# Patient Record
Sex: MALE | Race: WHITE | Employment: OTHER | URBAN - METROPOLITAN AREA
[De-identification: names, ages, dates, MRNs, and addresses within clinical notes are randomized per-mention and may not be internally consistent; named-entity substitution may affect disease eponyms.]

---

## 2018-02-21 LAB
CHOLESTEROL, TOTAL: 106 MG/DL
CHOLESTEROL/HDL RATIO: 4.2
HDLC SERPL-MCNC: 25 MG/DL (ref 35–70)
LDL CHOLESTEROL CALCULATED: 67 MG/DL (ref 0–160)
TRIGL SERPL-MCNC: 63 MG/DL
VLDLC SERPL CALC-MCNC: ABNORMAL MG/DL

## 2018-12-22 ENCOUNTER — HOSPITAL ENCOUNTER (INPATIENT)
Age: 73
LOS: 1 days | Discharge: HOME | DRG: 378 | End: 2018-12-23
Attending: INTERNAL MEDICINE | Admitting: INTERNAL MEDICINE
Payer: MEDICARE

## 2018-12-22 PROBLEM — D62 ACUTE BLOOD LOSS ANEMIA: Status: ACTIVE | Noted: 2018-12-22

## 2018-12-22 PROBLEM — J45.909 ASTHMA: Chronic | Status: ACTIVE | Noted: 2018-12-22

## 2018-12-22 PROBLEM — D64.9 ANEMIA: Status: ACTIVE | Noted: 2018-12-22

## 2018-12-22 PROBLEM — J44.9 COPD (CHRONIC OBSTRUCTIVE PULMONARY DISEASE) (HCC): Chronic | Status: ACTIVE | Noted: 2018-12-22

## 2018-12-22 PROBLEM — N40.0 BPH (BENIGN PROSTATIC HYPERPLASIA): Chronic | Status: ACTIVE | Noted: 2018-12-22

## 2018-12-22 LAB
ABO/RH: NORMAL
ANTIBODY SCREEN: NORMAL
APTT: 26.3 SEC (ref 24.5–35.1)
BLOOD BANK DISPENSE STATUS: NORMAL
BLOOD BANK PRODUCT CODE: NORMAL
BPU ID: NORMAL
DESCRIPTION BLOOD BANK: NORMAL
HCT VFR BLD CALC: 23.5 % (ref 37–54)
HCT VFR BLD CALC: 24.6 % (ref 37–54)
HEMOGLOBIN: 7.2 G/DL (ref 12.5–16.5)
HEMOGLOBIN: 7.6 G/DL (ref 12.5–16.5)
INR BLD: 1.3
PROTHROMBIN TIME: 14.6 SEC (ref 9.3–12.4)

## 2018-12-22 PROCEDURE — 86900 BLOOD TYPING SEROLOGIC ABO: CPT

## 2018-12-22 PROCEDURE — 94640 AIRWAY INHALATION TREATMENT: CPT

## 2018-12-22 PROCEDURE — 85730 THROMBOPLASTIN TIME PARTIAL: CPT

## 2018-12-22 PROCEDURE — 2060000000 HC ICU INTERMEDIATE R&B

## 2018-12-22 PROCEDURE — 86850 RBC ANTIBODY SCREEN: CPT

## 2018-12-22 PROCEDURE — 86901 BLOOD TYPING SEROLOGIC RH(D): CPT

## 2018-12-22 PROCEDURE — P9016 RBC LEUKOCYTES REDUCED: HCPCS

## 2018-12-22 PROCEDURE — 85018 HEMOGLOBIN: CPT

## 2018-12-22 PROCEDURE — 85610 PROTHROMBIN TIME: CPT

## 2018-12-22 PROCEDURE — C9113 INJ PANTOPRAZOLE SODIUM, VIA: HCPCS | Performed by: INTERNAL MEDICINE

## 2018-12-22 PROCEDURE — 6370000000 HC RX 637 (ALT 250 FOR IP): Performed by: INTERNAL MEDICINE

## 2018-12-22 PROCEDURE — 36430 TRANSFUSION BLD/BLD COMPNT: CPT

## 2018-12-22 PROCEDURE — 36415 COLL VENOUS BLD VENIPUNCTURE: CPT

## 2018-12-22 PROCEDURE — 6360000002 HC RX W HCPCS: Performed by: INTERNAL MEDICINE

## 2018-12-22 PROCEDURE — 86923 COMPATIBILITY TEST ELECTRIC: CPT

## 2018-12-22 PROCEDURE — 2580000003 HC RX 258: Performed by: INTERNAL MEDICINE

## 2018-12-22 PROCEDURE — 94664 DEMO&/EVAL PT USE INHALER: CPT

## 2018-12-22 PROCEDURE — 85014 HEMATOCRIT: CPT

## 2018-12-22 RX ORDER — ESOMEPRAZOLE MAGNESIUM 40 MG/1
40 FOR SUSPENSION ORAL DAILY
Status: ON HOLD | COMMUNITY
End: 2018-12-23 | Stop reason: HOSPADM

## 2018-12-22 RX ORDER — IPRATROPIUM BROMIDE AND ALBUTEROL SULFATE 2.5; .5 MG/3ML; MG/3ML
1 SOLUTION RESPIRATORY (INHALATION) EVERY 4 HOURS PRN
Status: DISCONTINUED | OUTPATIENT
Start: 2018-12-22 | End: 2018-12-23 | Stop reason: HOSPADM

## 2018-12-22 RX ORDER — IPRATROPIUM BROMIDE AND ALBUTEROL SULFATE 2.5; .5 MG/3ML; MG/3ML
1 SOLUTION RESPIRATORY (INHALATION) EVERY 4 HOURS
COMMUNITY
End: 2019-08-13 | Stop reason: SDUPTHER

## 2018-12-22 RX ORDER — ACETAMINOPHEN 325 MG/1
650 TABLET ORAL EVERY 4 HOURS PRN
Status: DISCONTINUED | OUTPATIENT
Start: 2018-12-22 | End: 2018-12-23 | Stop reason: HOSPADM

## 2018-12-22 RX ORDER — FERROUS SULFATE 325(65) MG
325 TABLET ORAL
COMMUNITY

## 2018-12-22 RX ORDER — PANTOPRAZOLE SODIUM 40 MG/10ML
40 INJECTION, POWDER, LYOPHILIZED, FOR SOLUTION INTRAVENOUS DAILY
Status: DISCONTINUED | OUTPATIENT
Start: 2018-12-22 | End: 2018-12-23

## 2018-12-22 RX ORDER — TAMSULOSIN HYDROCHLORIDE 0.4 MG/1
0.4 CAPSULE ORAL DAILY
COMMUNITY
End: 2019-07-08 | Stop reason: SDUPTHER

## 2018-12-22 RX ORDER — SODIUM CHLORIDE 0.9 % (FLUSH) 0.9 %
10 SYRINGE (ML) INJECTION PRN
Status: DISCONTINUED | OUTPATIENT
Start: 2018-12-22 | End: 2018-12-23 | Stop reason: HOSPADM

## 2018-12-22 RX ORDER — GUAIFENESIN 600 MG/1
1200 TABLET, EXTENDED RELEASE ORAL 2 TIMES DAILY
COMMUNITY
End: 2020-01-07 | Stop reason: CLARIF

## 2018-12-22 RX ORDER — SODIUM CHLORIDE 9 MG/ML
INJECTION, SOLUTION INTRAVENOUS CONTINUOUS
Status: DISCONTINUED | OUTPATIENT
Start: 2018-12-22 | End: 2018-12-23

## 2018-12-22 RX ORDER — TAMSULOSIN HYDROCHLORIDE 0.4 MG/1
0.4 CAPSULE ORAL DAILY
Status: DISCONTINUED | OUTPATIENT
Start: 2018-12-22 | End: 2018-12-23 | Stop reason: HOSPADM

## 2018-12-22 RX ORDER — VIT C/B6/B5/MAGNESIUM/HERB 173 50-5-6-5MG
500 CAPSULE ORAL 2 TIMES DAILY
COMMUNITY
End: 2020-01-07 | Stop reason: CLARIF

## 2018-12-22 RX ORDER — VIT C/B6/B5/MAGNESIUM/HERB 173 50-5-6-5MG
1000 CAPSULE ORAL 2 TIMES DAILY
COMMUNITY

## 2018-12-22 RX ORDER — ONDANSETRON 2 MG/ML
4 INJECTION INTRAMUSCULAR; INTRAVENOUS EVERY 6 HOURS PRN
Status: DISCONTINUED | OUTPATIENT
Start: 2018-12-22 | End: 2018-12-23 | Stop reason: HOSPADM

## 2018-12-22 RX ORDER — SODIUM CHLORIDE 0.9 % (FLUSH) 0.9 %
10 SYRINGE (ML) INJECTION EVERY 12 HOURS SCHEDULED
Status: DISCONTINUED | OUTPATIENT
Start: 2018-12-22 | End: 2018-12-23 | Stop reason: HOSPADM

## 2018-12-22 RX ORDER — 0.9 % SODIUM CHLORIDE 0.9 %
250 INTRAVENOUS SOLUTION INTRAVENOUS ONCE
Status: DISCONTINUED | OUTPATIENT
Start: 2018-12-22 | End: 2018-12-23

## 2018-12-22 RX ADMIN — IPRATROPIUM BROMIDE AND ALBUTEROL SULFATE 1 AMPULE: .5; 3 SOLUTION RESPIRATORY (INHALATION) at 08:11

## 2018-12-22 RX ADMIN — IPRATROPIUM BROMIDE AND ALBUTEROL SULFATE 1 AMPULE: .5; 3 SOLUTION RESPIRATORY (INHALATION) at 21:57

## 2018-12-22 RX ADMIN — TAMSULOSIN HYDROCHLORIDE 0.4 MG: 0.4 CAPSULE ORAL at 08:33

## 2018-12-22 RX ADMIN — SODIUM CHLORIDE: 9 INJECTION, SOLUTION INTRAVENOUS at 21:41

## 2018-12-22 RX ADMIN — IPRATROPIUM BROMIDE AND ALBUTEROL SULFATE 1 AMPULE: .5; 3 SOLUTION RESPIRATORY (INHALATION) at 17:47

## 2018-12-22 RX ADMIN — SODIUM CHLORIDE: 9 INJECTION, SOLUTION INTRAVENOUS at 08:33

## 2018-12-22 RX ADMIN — Medication 10 ML: at 08:33

## 2018-12-22 RX ADMIN — PANTOPRAZOLE SODIUM 40 MG: 40 INJECTION, POWDER, FOR SOLUTION INTRAVENOUS at 08:33

## 2018-12-22 ASSESSMENT — PAIN SCALES - GENERAL
PAINLEVEL_OUTOF10: 0

## 2018-12-23 ENCOUNTER — ANESTHESIA EVENT (OUTPATIENT)
Dept: ENDOSCOPY | Age: 73
DRG: 378 | End: 2018-12-23
Payer: MEDICARE

## 2018-12-23 ENCOUNTER — ANESTHESIA (OUTPATIENT)
Dept: ENDOSCOPY | Age: 73
DRG: 378 | End: 2018-12-23
Payer: MEDICARE

## 2018-12-23 VITALS
OXYGEN SATURATION: 93 % | HEIGHT: 71 IN | WEIGHT: 149 LBS | BODY MASS INDEX: 20.86 KG/M2 | RESPIRATION RATE: 18 BRPM | HEART RATE: 121 BPM | SYSTOLIC BLOOD PRESSURE: 98 MMHG | TEMPERATURE: 98.7 F | DIASTOLIC BLOOD PRESSURE: 53 MMHG

## 2018-12-23 VITALS
OXYGEN SATURATION: 94 % | SYSTOLIC BLOOD PRESSURE: 108 MMHG | RESPIRATION RATE: 25 BRPM | DIASTOLIC BLOOD PRESSURE: 64 MMHG

## 2018-12-23 LAB
ANION GAP SERPL CALCULATED.3IONS-SCNC: 12 MMOL/L (ref 7–16)
ANISOCYTOSIS: ABNORMAL
ATYPICAL LYMPHOCYTE RELATIVE PERCENT: 0.9 % (ref 0–4)
BASOPHILS ABSOLUTE: 0 E9/L (ref 0–0.2)
BASOPHILS RELATIVE PERCENT: 0.1 % (ref 0–2)
BUN BLDV-MCNC: 29 MG/DL (ref 8–23)
CALCIUM SERPL-MCNC: 8.1 MG/DL (ref 8.6–10.2)
CHLORIDE BLD-SCNC: 105 MMOL/L (ref 98–107)
CO2: 23 MMOL/L (ref 22–29)
CREAT SERPL-MCNC: 0.7 MG/DL (ref 0.7–1.2)
EOSINOPHILS ABSOLUTE: 0.08 E9/L (ref 0.05–0.5)
EOSINOPHILS RELATIVE PERCENT: 0.9 % (ref 0–6)
GFR AFRICAN AMERICAN: >60
GFR NON-AFRICAN AMERICAN: >60 ML/MIN/1.73
GLUCOSE BLD-MCNC: 88 MG/DL (ref 74–99)
HCT VFR BLD CALC: 22.8 % (ref 37–54)
HCT VFR BLD CALC: 24.5 % (ref 37–54)
HCT VFR BLD CALC: 25 % (ref 37–54)
HEMOGLOBIN: 7.4 G/DL (ref 12.5–16.5)
HEMOGLOBIN: 7.7 G/DL (ref 12.5–16.5)
HEMOGLOBIN: 7.9 G/DL (ref 12.5–16.5)
LYMPHOCYTES ABSOLUTE: 0.28 E9/L (ref 1.5–4)
LYMPHOCYTES RELATIVE PERCENT: 1.8 % (ref 20–42)
MCH RBC QN AUTO: 27.3 PG (ref 26–35)
MCHC RBC AUTO-ENTMCNC: 31.4 % (ref 32–34.5)
MCV RBC AUTO: 86.9 FL (ref 80–99.9)
MONOCYTES ABSOLUTE: 1.02 E9/L (ref 0.1–0.95)
MONOCYTES RELATIVE PERCENT: 10.5 % (ref 2–12)
NEUTROPHILS ABSOLUTE: 8 E9/L (ref 1.8–7.3)
NEUTROPHILS RELATIVE PERCENT: 86 % (ref 43–80)
PDW BLD-RTO: 17 FL (ref 11.5–15)
PLATELET # BLD: 260 E9/L (ref 130–450)
PMV BLD AUTO: 10.2 FL (ref 7–12)
POLYCHROMASIA: ABNORMAL
POTASSIUM SERPL-SCNC: 3.3 MMOL/L (ref 3.5–5)
RBC # BLD: 2.82 E12/L (ref 3.8–5.8)
SODIUM BLD-SCNC: 140 MMOL/L (ref 132–146)
WBC # BLD: 9.3 E9/L (ref 4.5–11.5)

## 2018-12-23 PROCEDURE — 0DJ08ZZ INSPECTION OF UPPER INTESTINAL TRACT, VIA NATURAL OR ARTIFICIAL OPENING ENDOSCOPIC: ICD-10-PCS | Performed by: SURGERY

## 2018-12-23 PROCEDURE — 3609012800 HC EGD DIAGNOSTIC ONLY: Performed by: SURGERY

## 2018-12-23 PROCEDURE — 6360000002 HC RX W HCPCS: Performed by: INTERNAL MEDICINE

## 2018-12-23 PROCEDURE — 85025 COMPLETE CBC W/AUTO DIFF WBC: CPT

## 2018-12-23 PROCEDURE — 2580000003 HC RX 258: Performed by: INTERNAL MEDICINE

## 2018-12-23 PROCEDURE — C9113 INJ PANTOPRAZOLE SODIUM, VIA: HCPCS | Performed by: INTERNAL MEDICINE

## 2018-12-23 PROCEDURE — 2709999900 HC NON-CHARGEABLE SUPPLY: Performed by: SURGERY

## 2018-12-23 PROCEDURE — 7100000000 HC PACU RECOVERY - FIRST 15 MIN: Performed by: SURGERY

## 2018-12-23 PROCEDURE — 6370000000 HC RX 637 (ALT 250 FOR IP): Performed by: INTERNAL MEDICINE

## 2018-12-23 PROCEDURE — 3700000000 HC ANESTHESIA ATTENDED CARE: Performed by: SURGERY

## 2018-12-23 PROCEDURE — 36415 COLL VENOUS BLD VENIPUNCTURE: CPT

## 2018-12-23 PROCEDURE — 85018 HEMOGLOBIN: CPT

## 2018-12-23 PROCEDURE — 3700000001 HC ADD 15 MINUTES (ANESTHESIA): Performed by: SURGERY

## 2018-12-23 PROCEDURE — 85014 HEMATOCRIT: CPT

## 2018-12-23 PROCEDURE — 80048 BASIC METABOLIC PNL TOTAL CA: CPT

## 2018-12-23 PROCEDURE — 7100000001 HC PACU RECOVERY - ADDTL 15 MIN: Performed by: SURGERY

## 2018-12-23 RX ORDER — POTASSIUM CHLORIDE AND SODIUM CHLORIDE 900; 300 MG/100ML; MG/100ML
INJECTION, SOLUTION INTRAVENOUS CONTINUOUS
Status: DISCONTINUED | OUTPATIENT
Start: 2018-12-23 | End: 2018-12-23

## 2018-12-23 RX ORDER — POTASSIUM CHLORIDE 20 MEQ/1
40 TABLET, EXTENDED RELEASE ORAL ONCE
Status: COMPLETED | OUTPATIENT
Start: 2018-12-23 | End: 2018-12-23

## 2018-12-23 RX ADMIN — POTASSIUM CHLORIDE 40 MEQ: 20 TABLET, EXTENDED RELEASE ORAL at 14:22

## 2018-12-23 RX ADMIN — Medication 10 ML: at 09:41

## 2018-12-23 RX ADMIN — PANTOPRAZOLE SODIUM 40 MG: 40 INJECTION, POWDER, FOR SOLUTION INTRAVENOUS at 09:41

## 2018-12-23 ASSESSMENT — PAIN SCALES - GENERAL
PAINLEVEL_OUTOF10: 0

## 2018-12-23 NOTE — ANESTHESIA POSTPROCEDURE EVALUATION
Department of Anesthesiology  Postprocedure Note    Patient: Kam Keita  MRN: [id number]  YOB: 1945  Date of evaluation: 12/23/2018  Time:  9:35 AM     Procedure Summary     Date:  12/23/18 Room / Location:  [facility] ENDOSCOPY    Anesthesia Start:  0808 Anesthesia Stop:  9187    Procedure:  EGD DIAGNOSTIC ONLY (N/A ) Diagnosis:  (unknown)    Surgeon:  Mic Leone MD Responsible Provider:  Grace Garcia DO    Anesthesia Type:  MAC ASA Status:  3          Anesthesia Type: MAC    Marck Phase I: Marck Score: 10    Marck Phase II:      Last vitals: Reviewed and per EMR flowsheets.        Anesthesia Post Evaluation    Patient location during evaluation: PACU  Patient participation: complete - patient participated  Level of consciousness: awake and alert  Pain score: 2  Airway patency: patent  Nausea & Vomiting: no nausea and no vomiting  Complications: no  Cardiovascular status: blood pressure returned to baseline and hemodynamically stable  Respiratory status: acceptable  Hydration status: euvolemic

## 2018-12-23 NOTE — ANESTHESIA PRE PROCEDURE
149 lb (67.6 kg)     Body mass index is 20.78 kg/m². CBC:   Lab Results   Component Value Date    WBC 9.3 12/23/2018    RBC 2.82 12/23/2018    HGB 7.7 12/23/2018    HCT 24.5 12/23/2018    MCV 86.9 12/23/2018    RDW 17.0 12/23/2018     12/23/2018       CMP:   Lab Results   Component Value Date     12/23/2018    K 3.3 12/23/2018     12/23/2018    CO2 23 12/23/2018    BUN 29 12/23/2018    CREATININE 0.7 12/23/2018    GFRAA >60 12/23/2018    LABGLOM >60 12/23/2018    GLUCOSE 88 12/23/2018    CALCIUM 8.1 12/23/2018       POC Tests: No results for input(s): POCGLU, POCNA, POCK, POCCL, POCBUN, POCHEMO, POCHCT in the last 72 hours. Coags:   Lab Results   Component Value Date    PROTIME 14.6 12/22/2018    INR 1.3 12/22/2018    APTT 26.3 12/22/2018       HCG (If Applicable): No results found for: PREGTESTUR, PREGSERUM, HCG, HCGQUANT     ABGs: No results found for: PHART, PO2ART, NFE0XHG, UDU5PLD, BEART, [id number]     Type & Screen (If Applicable):  No results found for: LABABO, LABRH    Anesthesia Evaluation    Airway: Mallampati: II        Dental:          Pulmonary:   (+) COPD:                            ROS comment: Smokes 7 cigarettes a day   Cardiovascular:Negative CV ROS                      Neuro/Psych:   Negative Neuro/Psych ROS              GI/Hepatic/Renal: Neg GI/Hepatic/Renal ROS           ROS comment: Hx GI bleed. Endo/Other: Negative Endo/Other ROS                    Abdominal:           Vascular: negative vascular ROS.                                        Anesthesia Plan      MAC     ASA 3                                 LEI Todd - CRNA   12/23/2018

## 2019-04-15 LAB
CREATININE: 0.8 MG/DL
GLUCOSE BLD-MCNC: 98 MG/DL
POTASSIUM (K+): 4.3

## 2019-05-25 PROBLEM — M48.062 SPINAL STENOSIS OF LUMBAR REGION WITH NEUROGENIC CLAUDICATION: Status: ACTIVE | Noted: 2019-05-25

## 2019-05-25 PROBLEM — J96.11 CHRONIC RESPIRATORY FAILURE WITH HYPOXIA (HCC): Status: ACTIVE | Noted: 2019-05-25

## 2019-05-25 PROBLEM — I65.23 BILATERAL CAROTID ARTERY STENOSIS: Status: ACTIVE | Noted: 2019-05-25

## 2019-05-25 PROBLEM — F32.9 REACTIVE DEPRESSION: Status: ACTIVE | Noted: 2019-05-25

## 2019-05-25 PROBLEM — D50.9 IRON DEFICIENCY ANEMIA: Status: ACTIVE | Noted: 2019-05-25

## 2019-05-25 PROBLEM — I73.9 PERIPHERAL VASCULAR DISEASE (HCC): Status: ACTIVE | Noted: 2019-05-25

## 2019-05-25 PROBLEM — E78.5 HYPERLIPIDEMIA: Status: ACTIVE | Noted: 2019-05-25

## 2019-05-25 PROBLEM — Z72.0 TOBACCO USER: Status: ACTIVE | Noted: 2019-05-25

## 2019-05-25 PROBLEM — I89.0 LYMPHEDEMA: Status: ACTIVE | Noted: 2019-05-25

## 2019-05-25 PROBLEM — N40.0 ENLARGED PROSTATE: Status: ACTIVE | Noted: 2019-05-25

## 2019-05-25 PROBLEM — I25.10 CHRONIC CORONARY ARTERY DISEASE: Status: ACTIVE | Noted: 2019-05-25

## 2019-05-25 PROBLEM — D64.9 CHRONIC ANEMIA: Status: ACTIVE | Noted: 2019-05-25

## 2019-05-25 NOTE — PROGRESS NOTES
19  Nila Trejo : 1945 Sex: male  Age: 69 y.o. No chief complaint on file. Status post hospitalization for acute exacerbation of COPD. Patient also had severe anemia. Had multiple transfusions. Most recently in the emergency room again with exacerbation of COPD. Patient is now oxygen dependent. Patient was seen by Dr. Duane Martinez during the hospitalization but his follow-up was not scheduled until . This is not acceptable and the patient will need to be seen by different pulmonologist in order to have closer follow-up. Patient did stop budesonide and subsequently I would like him to restart that medication. He is using his nebulized medications about every 4 hours while awake the patient does have follow-up this afternoon at noon to have blood transfusion and iron infusion. Patient did have EGD while in the hospital. Patient currently is on omeprazole and Carafate and this will be continued.       Review of Systems  Health Maintenance:  Influenza Vaccination - (10/10/2018)  Couseled on Home Safety - (3/16/2018)  Colonoscopy Screening - (2/3/2016)  Colonoscopy - (2011)  Colonoscopy - (2013)  [place]   1945 Page #2  Colonoscopy - (2/3/2016)  Psa Test - (2018)  Prostate Exam - (2018)  Rectal Exam - (2018)  Physical Exam - (2018)  Colonoscopy - (2010) -KOLOZSI  Aorta Screen -   EGD -  RANGEL'S, POSITIVE FOR H PYLORI EMANI ,- SEE REPORT, 2014-SEVERE EROSIVE  GASTRITIS-2016-KOLOZSI  Capsule Endoscopy - (2013)  Stress Test - (2014) NORMAL CARDIOLITE  Pneumonia Vaccination - Pneumovax 10/01/14  Prevnar slip given 17  Medical Problems:  Hypertension, Hyperlidemia  Tobacco Abuse - (2018) COUNSELING EVERY VISIT  Coronary Artery Disease (CAD) - [place]  CATH IN [place] BY Eleni Meckel LESS THAN 50% STENOSIS  Carotid Artery Stenosis - 30% , WORSENED   Peripheral Vascular Disease (PVD) - RIGHT SIDED CLAUDICATION SYMPTOMS  Lumbar Radiculopathy - MID 80'S FELL AT WORK  Heme Positive Stools - (2/2011) M EMANI- COLONOSCOPY/EGD 2/11  Barrettes Esophagitis - [facility] 2011  Colon Polyps - [facility] 2/11  Anemia - (1/3/2014) REFERRED BACK TO [facility] FOR REPEAT EGD AND POSSIBLE COLONOSCOPY- EGD  SHOWED SEVERE EROSIVE GASTRITIS- PPI BEGUN- CAPSULE AND PUSH ENTEROSCOPY DONE- MULTIPLE  [facility] 2018  COPD - IMPROVED SYMPTOMS WITH INHALERS-TRIAL ADVAIR 3/21/18  Pneumonia - (2/2014) FEB 2019  Thrombocytopenia - (3/2/2015) WOODS UNDERWAY  Severe Dental Attrition/Erosion - (1/20/2017) REFERRED TO MABEL  Chronic Otitis Media - (2/21/2018) REFERRED TO Ramya Armenta  Benign Prostatic Hypertrophy - (3/20/2019) REFERRED TO BL[facility]  Surgical Hx:  Appendectomy  Ear SX - LIPPY MID 80'S RIGHT  Dental Extraction - (2017)  Reviewed, no changes. FH:  Father:  Non Contributory. Mother:  Non Contributory. Reviewed, no changes. SH: Patient is . Personal Habits: Former smoker. Just recently quit. Occasionally drinks beer. Drinks on average 2 pots of coffee a  day. Walks daily. HAD ASBETOS EXPOSURE ON/OFF FOR 30 YEARS  Reviewed and updated. Date: 05/29/2019  Was the patient queried about smoking behavior? Yes   Does the patient currently smoke? Smoking: Patient is a former smoker - (4/8/2019). Was the patient counseled about smoking cessation?  Yes     Current Outpatient Medications:     cyanocobalamin 1000 MCG tablet, Take 1,000 mcg by mouth daily, Disp: , Rfl:     ferrous sulfate 325 (65 Fe) MG tablet, Take 325 mg by mouth daily (with breakfast), Disp: , Rfl:     ipratropium-albuterol (DUONEB) 0.5-2.5 (3) MG/3ML SOLN nebulizer solution, Inhale 1 vial into the lungs every 4 hours, Disp: , Rfl:     tamsulosin (FLOMAX) 0.4 MG capsule, Take 0.4 mg by mouth daily, Disp: , Rfl:     guaiFENesin (MUCINEX) 600 MG extended release tablet, Take 1,200 mg by mouth 2 times daily, Disp: , Rfl:     Turmeric 500 MG CAPS, Take 500 mg by mouth 2 times daily, Disp: , Rfl:     Turmeric (CVS TURMERIC CURCUMIN) 500 MG CAPS, Take 1,000 mg by mouth 2 times daily, Disp: , Rfl:   Allergies   Allergen Reactions    Aspirin      Chronic GI bleeding       Past Medical History:   Diagnosis Date    BPH (benign prostatic hyperplasia) 12/22/2018    COPD (chronic obstructive pulmonary disease) ([facility] [address] 12/22/2018     Past Surgical History:   Procedure Laterality Date    UPPER GASTROINTESTINAL ENDOSCOPY N/A 12/23/2018    EGD DIAGNOSTIC ONLY performed by Rosalee Azar MD at [address] [place] [address]     No family history on file.   Social History     Socioeconomic History    Marital status: Unknown     Spouse name: Not on file    Number of children: Not on file    Years of education: Not on file    Highest education level: Not on file   Occupational History    Not on file   Social Needs    Financial resource strain: Not on file    Food insecurity:     Worry: Not on file     Inability: Not on file    Transportation needs:     Medical: Not on file     Non-medical: Not on file   Tobacco Use    Smoking status: Current Every Day Smoker     Packs/day: 0.50     Types: Cigarettes    Smokeless tobacco: Former User   Substance and Sexual Activity    Alcohol use: Not on file    Drug use: Not on file    Sexual activity: Not on file   Lifestyle    Physical activity:     Days per week: Not on file     Minutes per session: Not on file    Stress: Not on file   Relationships    Social connections:     Talks on phone: Not on file     Gets together: Not on file     Attends Christianity service: Not on file     Active member of club or organization: Not on file     Attends meetings of clubs or organizations: Not on file     Relationship status: Not on file    Intimate partner violence:     Fear of current or ex partner: Not on file     Emotionally abused: Not on file     Physically abused: Not on file     Forced sexual activity: Not on file   Other Topics Concern    Not on file   Social History Narrative    Not on file       There were no vitals filed for this visit. Physical Exam  Exam:  Const: Appears chronically ill, under developed, well nourished and fatigued. Appears slightly underweight. Eyes: EOMI in both eyes. PERRL. ENMT: External ears WNL. Perforated tympanic membrane on the left without erythema. No erythema of the tympanic  membrane on the right. External nose WNL. Moistness and normal color of the nasal mucosae. Septum is in the  midline. Full upper and full lower dentures. Gums appear healthy. Posterior pharynx shows cobblestoned  appearance and moderate postnasal drip, but no exudate, irritation or redness. Neck: Supple and symmetric. Palpation reveals no adenopathy. No masses appreciated. Thyroid exhibits no nodule  or thyromegaly. No JVD respiratory patient has severe diminishment of breath sounds. No crackles or wheezes are heard currently. No dullness to percussion. appreciated over the lower lungs bilaterally or rhonchi appreciated over the lungs bilaterally. CV: Rhythm is regular. S1 is normal. S2 is normal. Grade 3/6, systolic murmur. Carotids with bruits bilaterally. Abdominal aorta: not palpable . Bilateral femoral bruit, more prominent on the left than the right. Pedal pulses are poorly  palpable bilaterally. Extremities: Edema, but no clubbing or cyanosis, woody edema above the knee bilaterally. Abdomen: Bowel sounds are normoactive. Palpation reveals softness, with no distension, organomegaly or  tenderness. No abdominal masses palpable. No palpable hepatosplenomegaly. Musculo: Walks with a normal gait. Upper Extremities: ROM is physiologic. Lower Extremities: Full ROM  bilaterally. Skin: Dry and warm with no rash. Neuro: Alert and oriented x3. Mood is normal. Affect is normal. Speech is articulate and fluent. Reflexes: DTR's are  symmetric, intact and 2+ bilaterally. Psych: Patient's attitude is cooperative. Patient's affect is appropriate.

## 2019-05-29 ENCOUNTER — OFFICE VISIT (OUTPATIENT)
Dept: FAMILY MEDICINE CLINIC | Age: 74
End: 2019-05-29
Payer: MEDICARE

## 2019-05-29 VITALS
HEIGHT: 71 IN | SYSTOLIC BLOOD PRESSURE: 136 MMHG | WEIGHT: 150 LBS | OXYGEN SATURATION: 94 % | HEART RATE: 109 BPM | DIASTOLIC BLOOD PRESSURE: 80 MMHG | BODY MASS INDEX: 21 KG/M2

## 2019-05-29 DIAGNOSIS — E78.2 MIXED HYPERLIPIDEMIA: ICD-10-CM

## 2019-05-29 DIAGNOSIS — F32.9 REACTIVE DEPRESSION: ICD-10-CM

## 2019-05-29 DIAGNOSIS — I89.0 LYMPHEDEMA: ICD-10-CM

## 2019-05-29 DIAGNOSIS — R39.12 BENIGN PROSTATIC HYPERPLASIA WITH WEAK URINARY STREAM: Chronic | ICD-10-CM

## 2019-05-29 DIAGNOSIS — J43.2 CENTRILOBULAR EMPHYSEMA (HCC): Chronic | ICD-10-CM

## 2019-05-29 DIAGNOSIS — D64.9 CHRONIC ANEMIA: ICD-10-CM

## 2019-05-29 DIAGNOSIS — N40.1 BENIGN PROSTATIC HYPERPLASIA WITH WEAK URINARY STREAM: Chronic | ICD-10-CM

## 2019-05-29 DIAGNOSIS — J96.11 CHRONIC RESPIRATORY FAILURE WITH HYPOXIA (HCC): ICD-10-CM

## 2019-05-29 DIAGNOSIS — M48.062 SPINAL STENOSIS OF LUMBAR REGION WITH NEUROGENIC CLAUDICATION: ICD-10-CM

## 2019-05-29 DIAGNOSIS — I65.23 BILATERAL CAROTID ARTERY STENOSIS: ICD-10-CM

## 2019-05-29 DIAGNOSIS — I25.10 CHRONIC CORONARY ARTERY DISEASE: Primary | ICD-10-CM

## 2019-05-29 DIAGNOSIS — I73.9 PERIPHERAL VASCULAR DISEASE (HCC): ICD-10-CM

## 2019-05-29 DIAGNOSIS — Z72.0 TOBACCO USER: ICD-10-CM

## 2019-05-29 PROCEDURE — 99214 OFFICE O/P EST MOD 30 MIN: CPT | Performed by: INTERNAL MEDICINE

## 2019-05-29 RX ORDER — BUDESONIDE 0.5 MG/2ML
500 INHALANT ORAL 2 TIMES DAILY
Qty: 60 AMPULE | Refills: 5 | Status: SHIPPED | OUTPATIENT
Start: 2019-05-29 | End: 2020-01-07 | Stop reason: SDUPTHER

## 2019-05-29 RX ORDER — ACETAMINOPHEN 325 MG/1
TABLET ORAL
COMMUNITY
Start: 2018-11-15 | End: 2020-01-07 | Stop reason: CLARIF

## 2019-05-29 RX ORDER — SUCRALFATE 1 G/1
TABLET ORAL
COMMUNITY
Start: 2019-05-18 | End: 2019-05-29 | Stop reason: SDUPTHER

## 2019-05-29 RX ORDER — PANTOPRAZOLE SODIUM 40 MG/1
TABLET, DELAYED RELEASE ORAL
Refills: 0 | COMMUNITY
Start: 2019-05-19 | End: 2019-05-29 | Stop reason: SDUPTHER

## 2019-05-29 RX ORDER — PANTOPRAZOLE SODIUM 40 MG/1
40 TABLET, DELAYED RELEASE ORAL DAILY
Qty: 90 TABLET | Refills: 1 | Status: SHIPPED | OUTPATIENT
Start: 2019-05-29 | End: 2020-01-07 | Stop reason: SDUPTHER

## 2019-05-29 RX ORDER — SUCRALFATE 1 G/1
1 TABLET ORAL 4 TIMES DAILY
Qty: 120 TABLET | Refills: 1 | Status: SHIPPED | OUTPATIENT
Start: 2019-05-29 | End: 2019-12-05 | Stop reason: SDUPTHER

## 2019-06-05 ENCOUNTER — TELEPHONE (OUTPATIENT)
Dept: FAMILY MEDICINE CLINIC | Age: 74
End: 2019-06-05

## 2019-06-06 ENCOUNTER — TELEPHONE (OUTPATIENT)
Dept: FAMILY MEDICINE CLINIC | Age: 74
End: 2019-06-06

## 2019-06-06 NOTE — TELEPHONE ENCOUNTER
Nurse rose lee stating thank you for the referral they will get to pt this weekend or early next week

## 2019-06-21 ENCOUNTER — TELEPHONE (OUTPATIENT)
Dept: FAMILY MEDICINE CLINIC | Age: 74
End: 2019-06-21

## 2019-06-21 NOTE — TELEPHONE ENCOUNTER
Priyank Feliz from [facility] calling to get some med  refill sent for him. When I calleed the pharmacy, they had all his scripts from May 29.

## 2019-06-24 LAB
BASOPHILS ABSOLUTE: ABNORMAL
BASOPHILS RELATIVE PERCENT: 2 %
EOSINOPHILS ABSOLUTE: 0.12 /ΜL
EOSINOPHILS RELATIVE PERCENT: 5 %
HCT VFR BLD CALC: 33.1 % (ref 41–53)
HEMOGLOBIN: 10.2 G/DL (ref 13.5–17.5)
LYMPHOCYTES ABSOLUTE: 0.51 /ΜL
LYMPHOCYTES RELATIVE PERCENT: 8 %
MCH RBC QN AUTO: 23.5 PG
MCHC RBC AUTO-ENTMCNC: 30.9 G/DL
MCV RBC AUTO: 76.1 FL
MONOCYTES ABSOLUTE: 0.7 /ΜL
MONOCYTES RELATIVE PERCENT: 11 %
NEUTROPHILS ABSOLUTE: ABNORMAL
NEUTROPHILS RELATIVE PERCENT: 4.73 %
PDW BLD-RTO: 22.8 %
PLATELET # BLD: 279 K/ΜL
PMV BLD AUTO: 7.5 FL
RBC # BLD: 4.35 10^6/ΜL
WBC # BLD: 6.4 10^3/ML

## 2019-07-02 RX ORDER — TAMSULOSIN HYDROCHLORIDE 0.4 MG/1
CAPSULE ORAL
Qty: 30 CAPSULE | Refills: 5 | OUTPATIENT
Start: 2019-07-02

## 2019-07-08 RX ORDER — TAMSULOSIN HYDROCHLORIDE 0.4 MG/1
0.4 CAPSULE ORAL DAILY
Qty: 30 CAPSULE | Refills: 1 | Status: SHIPPED | OUTPATIENT
Start: 2019-07-08 | End: 2020-01-07 | Stop reason: SDUPTHER

## 2019-08-01 ENCOUNTER — TELEPHONE (OUTPATIENT)
Dept: ADMINISTRATIVE | Age: 74
End: 2019-08-01

## 2019-08-02 ENCOUNTER — OFFICE VISIT (OUTPATIENT)
Dept: FAMILY MEDICINE CLINIC | Age: 74
End: 2019-08-02
Payer: MEDICARE

## 2019-08-02 VITALS
HEART RATE: 74 BPM | OXYGEN SATURATION: 95 % | SYSTOLIC BLOOD PRESSURE: 106 MMHG | BODY MASS INDEX: 20.08 KG/M2 | DIASTOLIC BLOOD PRESSURE: 64 MMHG | WEIGHT: 146 LBS

## 2019-08-02 DIAGNOSIS — I25.10 CHRONIC CORONARY ARTERY DISEASE: ICD-10-CM

## 2019-08-02 DIAGNOSIS — J43.2 CENTRILOBULAR EMPHYSEMA (HCC): ICD-10-CM

## 2019-08-02 DIAGNOSIS — M54.10 RADICULOPATHY, UNSPECIFIED SPINAL REGION: Primary | ICD-10-CM

## 2019-08-02 PROCEDURE — 99213 OFFICE O/P EST LOW 20 MIN: CPT | Performed by: INTERNAL MEDICINE

## 2019-08-02 RX ORDER — PREDNISONE 10 MG/1
TABLET ORAL
Qty: 12 TABLET | Refills: 0 | Status: SHIPPED | OUTPATIENT
Start: 2019-08-02 | End: 2019-08-08

## 2019-08-03 ENCOUNTER — TELEPHONE (OUTPATIENT)
Dept: FAMILY MEDICINE CLINIC | Age: 74
End: 2019-08-03

## 2019-08-03 ASSESSMENT — ENCOUNTER SYMPTOMS
COUGH: 1
SHORTNESS OF BREATH: 1
COLOR CHANGE: 0
ABDOMINAL PAIN: 0
WHEEZING: 0
BACK PAIN: 1

## 2019-08-05 LAB
BASOPHILS ABSOLUTE: NORMAL /ΜL
BASOPHILS RELATIVE PERCENT: NORMAL %
EOSINOPHILS ABSOLUTE: NORMAL /ΜL
EOSINOPHILS RELATIVE PERCENT: NORMAL %
HCT VFR BLD CALC: NORMAL % (ref 41–53)
HEMOGLOBIN: NORMAL G/DL (ref 13.5–17.5)
LYMPHOCYTES ABSOLUTE: NORMAL /ΜL
LYMPHOCYTES RELATIVE PERCENT: NORMAL %
MCH RBC QN AUTO: NORMAL PG
MCHC RBC AUTO-ENTMCNC: NORMAL G/DL
MCV RBC AUTO: NORMAL FL
MONOCYTES ABSOLUTE: NORMAL /ΜL
MONOCYTES RELATIVE PERCENT: NORMAL %
NEUTROPHILS ABSOLUTE: NORMAL /ΜL
NEUTROPHILS RELATIVE PERCENT: NORMAL %
PLATELET # BLD: NORMAL K/ΜL
PMV BLD AUTO: NORMAL FL
RBC # BLD: NORMAL 10^6/ΜL
WBC # BLD: NORMAL 10^3/ML

## 2019-08-13 PROBLEM — R09.02 HYPOXIA: Status: ACTIVE | Noted: 2019-08-13

## 2019-08-14 RX ORDER — ESOMEPRAZOLE MAGNESIUM 40 MG/1
40 CAPSULE, DELAYED RELEASE ORAL
COMMUNITY
End: 2020-01-07 | Stop reason: CLARIF

## 2019-08-19 ENCOUNTER — OFFICE VISIT (OUTPATIENT)
Dept: PRIMARY CARE CLINIC | Age: 74
End: 2019-08-19
Payer: MEDICARE

## 2019-08-19 VITALS
BODY MASS INDEX: 20.72 KG/M2 | HEART RATE: 82 BPM | HEIGHT: 71 IN | OXYGEN SATURATION: 94 % | WEIGHT: 148 LBS | DIASTOLIC BLOOD PRESSURE: 60 MMHG | SYSTOLIC BLOOD PRESSURE: 116 MMHG

## 2019-08-19 DIAGNOSIS — D50.0 IRON DEFICIENCY ANEMIA DUE TO CHRONIC BLOOD LOSS: ICD-10-CM

## 2019-08-19 DIAGNOSIS — S32.008D CLOSED CRUSH FRACTURE OF LUMBAR VERTEBRA WITH ROUTINE HEALING, SUBSEQUENT ENCOUNTER: ICD-10-CM

## 2019-08-19 DIAGNOSIS — J43.2 CENTRILOBULAR EMPHYSEMA (HCC): ICD-10-CM

## 2019-08-19 DIAGNOSIS — N40.1 BENIGN PROSTATIC HYPERPLASIA WITH WEAK URINARY STREAM: ICD-10-CM

## 2019-08-19 DIAGNOSIS — R39.12 BENIGN PROSTATIC HYPERPLASIA WITH WEAK URINARY STREAM: ICD-10-CM

## 2019-08-19 DIAGNOSIS — S22.008D: Primary | ICD-10-CM

## 2019-08-19 PROCEDURE — 99214 OFFICE O/P EST MOD 30 MIN: CPT | Performed by: INTERNAL MEDICINE

## 2019-08-19 RX ORDER — CALCITONIN SALMON 200 [IU]/.09ML
1 SPRAY, METERED NASAL DAILY
Qty: 1 BOTTLE | Refills: 3 | Status: SHIPPED | OUTPATIENT
Start: 2019-08-19 | End: 2020-01-07 | Stop reason: CLARIF

## 2019-08-19 RX ORDER — TRAMADOL HYDROCHLORIDE 50 MG/1
50 TABLET ORAL EVERY 6 HOURS PRN
Qty: 28 TABLET | Refills: 0 | Status: SHIPPED | OUTPATIENT
Start: 2019-08-19 | End: 2019-08-26

## 2019-08-19 NOTE — PROGRESS NOTES
TO [facility]MAN[facility] FOR REPEAT EGD AND POSSIBLE COLONOSCOPY- EGD  SHOWED SEVERE EROSIVE GASTRITIS- PPI BEGUN- CAPSULE AND PUSH ENTEROSCOPY DONE- MULTIPLE  [facility]  [facility], NOW [facility] 2018  COPD - IMPROVED SYMPTOMS WITH INHALERS-TRIAL ADVAIR 3/21/18  Pneumonia - (2/2014) FEB 2019  Thrombocytopenia - (3/2/2015) WOODS UNDERWAY  Severe Dental Attrition/Erosion - (1/20/2017) REFERRED TO MABEL  Chronic Otitis Media - (2/21/2018) REFERRED TO NENITA  Benign Prostatic Hypertrophy - (3/20/2019) REFERRED TO BLACK  Surgical Hx:  Appendectomy  Ear SX - LIPPY MID 80'S RIGHT  Dental Extraction - (2017)  Reviewed, no changes. FH:  Father:  Non Contributory. Mother:  Non Contributory. Reviewed, no changes. SH: Patient is . Personal Habits: Former smoker. Just recently quit. Occasionally drinks beer. Drinks on average 2 pots of coffee a  day. Walks daily. HAD ASBETOS EXPOSURE ON/OFF FOR 30 YEARS  Reviewed and updated. Date: 05/29/2019  Was the patient queried about smoking behavior? Yes   Does the patient currently smoke? Smoking: Patient is a former smoker - (4/8/2019). Was the patient counseled about smoking cessation?  Yes     Current Outpatient Medications:     albuterol-ipratropium (COMBIVENT RESPIMAT)  MCG/ACT AERS inhaler, Inhale 1 puff into the lungs every 6 hours, Disp: , Rfl:     Fluticasone-Umeclidin-Vilant (TRELEGY ELLIPTA) 100-62.5-25 MCG/INH AEPB, Inhale into the lungs, Disp: , Rfl:     esomeprazole (NEXIUM) 40 MG delayed release capsule, Take 40 mg by mouth every morning (before breakfast), Disp: , Rfl:     vitamin D (CHOLECALCIFEROL) 1000 units TABS tablet, Take 1,000 Units by mouth daily, Disp: , Rfl:     ipratropium-albuterol (DUONEB) 0.5-2.5 (3) MG/3ML SOLN nebulizer solution, Inhale 3 mLs into the lungs every 4 hours, Disp: 180 vial, Rfl: 5    tamsulosin (FLOMAX) 0.4 MG capsule, Take 1 capsule by mouth daily, Disp: 30 capsule, Rfl: 1    acetaminophen (TYLENOL) 325 MG tablet, Take by

## 2019-08-25 DIAGNOSIS — I73.9 PERIPHERAL VASCULAR DISEASE (HCC): Primary | ICD-10-CM

## 2019-08-25 DIAGNOSIS — E78.2 MIXED HYPERLIPIDEMIA: ICD-10-CM

## 2019-08-26 ENCOUNTER — TELEPHONE (OUTPATIENT)
Dept: ADMINISTRATIVE | Age: 74
End: 2019-08-26

## 2019-09-06 DIAGNOSIS — E78.2 MIXED HYPERLIPIDEMIA: ICD-10-CM

## 2019-09-06 DIAGNOSIS — I73.9 PERIPHERAL VASCULAR DISEASE (HCC): ICD-10-CM

## 2019-09-09 LAB
BASOPHILS ABSOLUTE: ABNORMAL /ΜL
BASOPHILS RELATIVE PERCENT: ABNORMAL %
EOSINOPHILS ABSOLUTE: ABNORMAL /ΜL
EOSINOPHILS RELATIVE PERCENT: ABNORMAL %
HCT VFR BLD CALC: 31.1 % (ref 41–53)
HEMOGLOBIN: 9.8 G/DL (ref 13.5–17.5)
LYMPHOCYTES ABSOLUTE: ABNORMAL /ΜL
LYMPHOCYTES RELATIVE PERCENT: ABNORMAL %
MCH RBC QN AUTO: ABNORMAL PG
MCHC RBC AUTO-ENTMCNC: ABNORMAL G/DL
MCV RBC AUTO: ABNORMAL FL
MONOCYTES ABSOLUTE: ABNORMAL /ΜL
MONOCYTES RELATIVE PERCENT: ABNORMAL %
NEUTROPHILS ABSOLUTE: ABNORMAL /ΜL
NEUTROPHILS RELATIVE PERCENT: ABNORMAL %
PLATELET # BLD: 213 K/ΜL
PMV BLD AUTO: ABNORMAL FL
RBC # BLD: 3.94 10^6/ΜL
WBC # BLD: 5.9 10^3/ML

## 2019-09-10 ENCOUNTER — OFFICE VISIT (OUTPATIENT)
Dept: FAMILY MEDICINE CLINIC | Age: 74
End: 2019-09-10
Payer: MEDICARE

## 2019-09-10 VITALS
SYSTOLIC BLOOD PRESSURE: 114 MMHG | HEART RATE: 64 BPM | BODY MASS INDEX: 20.36 KG/M2 | OXYGEN SATURATION: 95 % | TEMPERATURE: 98.3 F | WEIGHT: 146 LBS | DIASTOLIC BLOOD PRESSURE: 68 MMHG

## 2019-09-10 DIAGNOSIS — J43.2 CENTRILOBULAR EMPHYSEMA (HCC): Chronic | ICD-10-CM

## 2019-09-10 DIAGNOSIS — Z23 ENCOUNTER FOR IMMUNIZATION: Primary | ICD-10-CM

## 2019-09-10 DIAGNOSIS — S22.008D: ICD-10-CM

## 2019-09-10 PROBLEM — C61 PROSTATE CANCER (HCC): Status: ACTIVE | Noted: 2019-09-10

## 2019-09-10 PROCEDURE — G0008 ADMIN INFLUENZA VIRUS VAC: HCPCS | Performed by: INTERNAL MEDICINE

## 2019-09-10 PROCEDURE — 90653 IIV ADJUVANT VACCINE IM: CPT | Performed by: INTERNAL MEDICINE

## 2019-09-10 PROCEDURE — 99214 OFFICE O/P EST MOD 30 MIN: CPT | Performed by: INTERNAL MEDICINE

## 2019-09-10 RX ORDER — OXYCODONE AND ACETAMINOPHEN 7.5; 325 MG/1; MG/1
1 TABLET ORAL EVERY 8 HOURS PRN
Qty: 90 TABLET | Refills: 0 | Status: SHIPPED | OUTPATIENT
Start: 2019-09-10 | End: 2020-01-07 | Stop reason: SDUPTHER

## 2019-09-10 NOTE — PROGRESS NOTES
esomeprazole (NEXIUM) 40 MG delayed release capsule, Take 40 mg by mouth every morning (before breakfast), Disp: , Rfl:     vitamin D (CHOLECALCIFEROL) 1000 units TABS tablet, Take 1,000 Units by mouth daily, Disp: , Rfl:     ipratropium-albuterol (DUONEB) 0.5-2.5 (3) MG/3ML SOLN nebulizer solution, Inhale 3 mLs into the lungs every 4 hours, Disp: 180 vial, Rfl: 5    tamsulosin (FLOMAX) 0.4 MG capsule, Take 1 capsule by mouth daily, Disp: 30 capsule, Rfl: 1    acetaminophen (TYLENOL) 325 MG tablet, Take by mouth, Disp: , Rfl:     sucralfate (CARAFATE) 1 GM tablet, Take 1 tablet by mouth 4 times daily, Disp: 120 tablet, Rfl: 1    pantoprazole (PROTONIX) 40 MG tablet, Take 1 tablet by mouth daily, Disp: 90 tablet, Rfl: 1    budesonide (PULMICORT) 0.5 MG/2ML nebulizer suspension, Take 2 mLs by nebulization 2 times daily, Disp: 60 ampule, Rfl: 5    cyanocobalamin 1000 MCG tablet, Take 1,000 mcg by mouth daily, Disp: , Rfl:     ferrous sulfate 325 (65 Fe) MG tablet, Take 325 mg by mouth daily (with breakfast), Disp: , Rfl:     guaiFENesin (MUCINEX) 600 MG extended release tablet, Take 1,200 mg by mouth 2 times daily, Disp: , Rfl:     Turmeric 500 MG CAPS, Take 500 mg by mouth 2 times daily, Disp: , Rfl:     Turmeric (CVS TURMERIC CURCUMIN) 500 MG CAPS, Take 1,000 mg by mouth 2 times daily, Disp: , Rfl:   Allergies   Allergen Reactions    Aspirin      Chronic GI bleeding       Past Medical History:   Diagnosis Date    BPH (benign prostatic hyperplasia) 12/22/2018    COPD (chronic obstructive pulmonary disease) ([facility] [address] 12/22/2018     Past Surgical History:   Procedure Laterality Date    BRONCHOSCOPY      UPPER GASTROINTESTINAL ENDOSCOPY N/A 12/23/2018    EGD DIAGNOSTIC ONLY performed by Negrita Cordon MD at [facility] ENDOSCOPY     No family history on file.   Social History     Socioeconomic History    Marital status: Unknown     Spouse name: Not on file    Number of children: Not on file    Years of oxyCODONE-acetaminophen (ENDOCET) 7.5-325 MG per tablet; Take 1 tablet by mouth every 8 hours as needed for Pain for up to 30 days. Intended supply: 30 days  -     Gabby Wood DO, Orthopaedics, [place] ([facility])    Since anemia has been stable. Today his lung disease is stable. He is medically optimized for surgical procedure. The note will be sent to Dr. Iris Robison and I will communicate directly to Dr. Iris Robison to have the surgery done as quickly as possible. I have also referred him to Dr. James May regarding his thoracic spine pain. Pain medication is provided as the patient is not gotten sufficient relief using hydrocodone and Miacalcin nasal spray.

## 2019-09-21 LAB
ALBUMIN SERPL-MCNC: 4 G/DL
ALP BLD-CCNC: 52 U/L
ALT SERPL-CCNC: 12 U/L
ANION GAP SERPL CALCULATED.3IONS-SCNC: 1.1 MMOL/L
AST SERPL-CCNC: 20 U/L
BASOPHILS ABSOLUTE: 0.23 /ΜL
BASOPHILS RELATIVE PERCENT: 3 %
BILIRUB SERPL-MCNC: 0.8 MG/DL (ref 0.1–1.4)
BILIRUBIN URINE: NORMAL MG/DL
BILIRUBIN, URINE: NEGATIVE
BLOOD, URINE: NORMAL
BLOOD, URINE: POSITIVE
BUN BLDV-MCNC: 14 MG/DL
CALCIUM SERPL-MCNC: 8.8 MG/DL
CHLORIDE BLD-SCNC: 103 MMOL/L
CLARITY: ABNORMAL
CLARITY: NORMAL
CO2: 26 MMOL/L
COLOR: ABNORMAL
COLOR: NORMAL
CREAT SERPL-MCNC: 0.9 MG/DL
EOSINOPHILS ABSOLUTE: 0.15 /ΜL
EOSINOPHILS RELATIVE PERCENT: 2 %
GFR CALCULATED: NORMAL
GLUCOSE BLD-MCNC: 99 MG/DL
GLUCOSE URINE: NEGATIVE
GLUCOSE URINE: NORMAL
HCT VFR BLD CALC: 30.8 % (ref 41–53)
HEMOGLOBIN: 9.7 G/DL (ref 13.5–17.5)
KETONES, URINE: NEGATIVE
KETONES, URINE: NORMAL
LEUKOCYTE ESTERASE, URINE: NORMAL
LEUKOCYTE ESTERASE, URINE: POSITIVE
LYMPHOCYTES ABSOLUTE: 0.85 /ΜL
LYMPHOCYTES RELATIVE PERCENT: 11 %
MCH RBC QN AUTO: 25.3 PG
MCHC RBC AUTO-ENTMCNC: 31.6 G/DL
MCV RBC AUTO: 80 FL
MONOCYTES ABSOLUTE: 0.78 /ΜL
MONOCYTES RELATIVE PERCENT: 10 %
NEUTROPHILS ABSOLUTE: 5.77 /ΜL
NEUTROPHILS RELATIVE PERCENT: ABNORMAL %
NITRITE, URINE: NEGATIVE
NITRITE, URINE: NORMAL
PH UA: 6 (ref 4.5–8)
PH UA: NORMAL (ref 4.5–8)
PLATELET # BLD: 265 K/ΜL
PMV BLD AUTO: 8.1 FL
POTASSIUM SERPL-SCNC: 4.3 MMOL/L
PROTEIN UA: NORMAL
PROTEIN UA: POSITIVE
RBC # BLD: 3.85 10^6/ΜL
SODIUM BLD-SCNC: 138 MMOL/L
SPECIFIC GRAVITY UA: NORMAL (ref 1–1.03)
SPECIFIC GRAVITY, URINE: 1.01
TOTAL PROTEIN: 7.5
UROBILINOGEN, URINE: NORMAL
UROBILINOGEN, URINE: NORMAL
WBC # BLD: 7.8 10^3/ML

## 2019-10-07 LAB
BASOPHILS ABSOLUTE: NORMAL
BASOPHILS RELATIVE PERCENT: NORMAL
EOSINOPHILS ABSOLUTE: NORMAL
EOSINOPHILS RELATIVE PERCENT: NORMAL
FERRITIN: NORMAL
HCT VFR BLD CALC: NORMAL %
HEMOGLOBIN: NORMAL
IRON: NORMAL
LYMPHOCYTES ABSOLUTE: NORMAL
LYMPHOCYTES RELATIVE PERCENT: NORMAL
MCH RBC QN AUTO: NORMAL PG
MCHC RBC AUTO-ENTMCNC: NORMAL G/DL
MCV RBC AUTO: NORMAL FL
MONOCYTES ABSOLUTE: NORMAL
MONOCYTES RELATIVE PERCENT: NORMAL
NEUTROPHILS ABSOLUTE: NORMAL
NEUTROPHILS RELATIVE PERCENT: NORMAL
PDW BLD-RTO: NORMAL %
PLATELET # BLD: NORMAL 10*3/UL
PMV BLD AUTO: NORMAL FL
RBC # BLD: NORMAL 10*6/UL
TOTAL IRON BINDING CAPACITY: NORMAL
WBC # BLD: NORMAL 10*3/UL

## 2019-12-05 DIAGNOSIS — D64.9 CHRONIC ANEMIA: ICD-10-CM

## 2019-12-05 RX ORDER — SUCRALFATE 1 G/1
1 TABLET ORAL 4 TIMES DAILY
Qty: 120 TABLET | Refills: 2 | Status: SHIPPED
Start: 2019-12-05 | End: 2020-04-30 | Stop reason: SDUPTHER

## 2020-01-07 ENCOUNTER — OFFICE VISIT (OUTPATIENT)
Dept: FAMILY MEDICINE CLINIC | Age: 75
End: 2020-01-07
Payer: MEDICARE

## 2020-01-07 VITALS — OXYGEN SATURATION: 100 % | HEART RATE: 90 BPM | DIASTOLIC BLOOD PRESSURE: 70 MMHG | SYSTOLIC BLOOD PRESSURE: 118 MMHG

## 2020-01-07 PROBLEM — E61.1 IRON DEFICIENCY: Status: ACTIVE | Noted: 2020-01-07

## 2020-01-07 PROBLEM — C67.8 MALIGNANT NEOPLASM OF OVERLAPPING SITES OF BLADDER (HCC): Status: ACTIVE | Noted: 2020-01-07

## 2020-01-07 PROCEDURE — G8427 DOCREV CUR MEDS BY ELIG CLIN: HCPCS | Performed by: INTERNAL MEDICINE

## 2020-01-07 PROCEDURE — 4040F PNEUMOC VAC/ADMIN/RCVD: CPT | Performed by: INTERNAL MEDICINE

## 2020-01-07 PROCEDURE — 3017F COLORECTAL CA SCREEN DOC REV: CPT | Performed by: INTERNAL MEDICINE

## 2020-01-07 PROCEDURE — G8420 CALC BMI NORM PARAMETERS: HCPCS | Performed by: INTERNAL MEDICINE

## 2020-01-07 PROCEDURE — 99214 OFFICE O/P EST MOD 30 MIN: CPT | Performed by: INTERNAL MEDICINE

## 2020-01-07 PROCEDURE — 3023F SPIROM DOC REV: CPT | Performed by: INTERNAL MEDICINE

## 2020-01-07 PROCEDURE — 4004F PT TOBACCO SCREEN RCVD TLK: CPT | Performed by: INTERNAL MEDICINE

## 2020-01-07 PROCEDURE — G8926 SPIRO NO PERF OR DOC: HCPCS | Performed by: INTERNAL MEDICINE

## 2020-01-07 PROCEDURE — 1123F ACP DISCUSS/DSCN MKR DOCD: CPT | Performed by: INTERNAL MEDICINE

## 2020-01-07 PROCEDURE — G8482 FLU IMMUNIZE ORDER/ADMIN: HCPCS | Performed by: INTERNAL MEDICINE

## 2020-01-07 RX ORDER — IPRATROPIUM BROMIDE AND ALBUTEROL SULFATE 2.5; .5 MG/3ML; MG/3ML
1 SOLUTION RESPIRATORY (INHALATION) EVERY 4 HOURS
Qty: 360 ML | Refills: 5 | Status: SHIPPED
Start: 2020-01-07 | End: 2020-05-19 | Stop reason: SDUPTHER

## 2020-01-07 RX ORDER — OXYCODONE AND ACETAMINOPHEN 7.5; 325 MG/1; MG/1
1 TABLET ORAL EVERY 8 HOURS PRN
Qty: 90 TABLET | Refills: 0 | Status: SHIPPED | OUTPATIENT
Start: 2020-01-07 | End: 2020-02-06

## 2020-01-07 RX ORDER — PANTOPRAZOLE SODIUM 40 MG/1
40 TABLET, DELAYED RELEASE ORAL DAILY
Qty: 90 TABLET | Refills: 1 | Status: SHIPPED
Start: 2020-01-07 | End: 2020-05-19 | Stop reason: SDUPTHER

## 2020-01-07 RX ORDER — TAMSULOSIN HYDROCHLORIDE 0.4 MG/1
0.4 CAPSULE ORAL 2 TIMES DAILY
Qty: 180 CAPSULE | Refills: 1 | Status: SHIPPED
Start: 2020-01-07 | End: 2020-05-19 | Stop reason: SDUPTHER

## 2020-01-07 RX ORDER — BUDESONIDE 0.5 MG/2ML
500 INHALANT ORAL 2 TIMES DAILY
Qty: 60 AMPULE | Refills: 5 | Status: SHIPPED
Start: 2020-01-07 | End: 2020-04-22 | Stop reason: SDUPTHER

## 2020-01-07 RX ORDER — PRIMIDONE 50 MG/1
50 TABLET ORAL NIGHTLY
Qty: 30 TABLET | Refills: 3 | Status: SHIPPED
Start: 2020-01-07 | End: 2020-05-06 | Stop reason: SDUPTHER

## 2020-01-07 NOTE — PROGRESS NOTES
19  Bang Hahn : 1945 Sex: male  Age: 69 y.o. Chief Complaint   Patient presents with    Other     follow up after surgery       This is a patient who had recently discovered bladder cancer. He has had 6 treatments and is scheduled again for cystoscopy in March with Dr. Radha Odell. Patient has developed some essential tremor of the left upper extremity. He really does not have any other features of parkinsonian type picture. The patient does have chronic anemia. He is got chronic blood loss through the GI tract. He is followed every other week by Dr. Perla Gill. Recently his hemoglobin dropped down to 8.3. He did receive both blood transfusion and iron transfusion. Patient continues to smoke to some extent. He and I discussed cessation once again. Patient is using his oxygen on an as-needed basis. I did read his pulmonary note and it did indicate that he should be using this at nighttime. I did discuss this with him. Patient's immunizations are up-to-date including flu vaccine. Patient denies cardiac symptomatology. He has had no gross hematuria. The patient does have some weakness and he has developed some cataract. He states that he will be scheduled in the short-term for cataract surgery especially of the right eye. The patient does not drive often and does not drive at all at night.     Other         Review of Systems  Health Maintenance:  Influenza Vaccination - (10/2019)  Couseled on Home Safety - (3/16/2018)  Colonoscopy Screening - (2/3/2016)  Colonoscopy - (2011)  Colonoscopy - (2013)  Colonoscopy - (2/3/2016)  Psa Test - (2018)  Prostate Exam - (2018)  Rectal Exam - (2018)  Physical Exam - (2018)  Colonoscopy - (2010) 2016-KOLOZSI  Aorta Screen -   EGD -  RANGEL'S, POSITIVE FOR H PYLORI EMANI2013- SEE REPORT, 2014-SEVERE EROSIVE  GASTRITIS-2016-KOLOZSI  Capsule Endoscopy - (2013)  Stress Test - (2014) NORMAL Rfl: 2    vitamin D (CHOLECALCIFEROL) 1000 units TABS tablet, Take 1,000 Units by mouth daily, Disp: , Rfl:     tamsulosin (FLOMAX) 0.4 MG capsule, Take 1 capsule by mouth daily, Disp: 30 capsule, Rfl: 1    pantoprazole (PROTONIX) 40 MG tablet, Take 1 tablet by mouth daily, Disp: 90 tablet, Rfl: 1    budesonide (PULMICORT) 0.5 MG/2ML nebulizer suspension, Take 2 mLs by nebulization 2 times daily, Disp: 60 ampule, Rfl: 5    cyanocobalamin 1000 MCG tablet, Take 1,000 mcg by mouth daily, Disp: , Rfl:     ferrous sulfate 325 (65 Fe) MG tablet, Take 325 mg by mouth daily (with breakfast), Disp: , Rfl:     Turmeric (CVS TURMERIC CURCUMIN) 500 MG CAPS, Take 1,000 mg by mouth 2 times daily, Disp: , Rfl:   Allergies   Allergen Reactions    Aspirin      Chronic GI bleeding       Past Medical History:   Diagnosis Date    BPH (benign prostatic hyperplasia) 12/22/2018    [facility] 12/22/2018     Past Surgical History:   Procedure Laterality Date    BRONCHOSCOPY      UPPER GASTROINTESTINAL ENDOSCOPY N/A 12/23/2018    EGD DIAGNOSTIC ONLY performed by Parley Bamberger, MD at [address] [place] [address]     No family history on file.   Social History     Socioeconomic History    Marital status: Unknown     Spouse name: Not on file    Number of children: Not on file    Years of education: Not on file    Highest education level: Not on file   Occupational History    Not on file   Social Needs    Financial resource strain: Not on file    Food insecurity:     Worry: Not on file     Inability: Not on file    Transportation needs:     Medical: Not on file     Non-medical: Not on file   Tobacco Use    Smoking status: Current Every Day Smoker     Packs/day: 0.25     Years: 54.00     Pack years: 13.50     Types: Cigarettes     Start date: 7/9/1965    Smokeless tobacco: Former User    Tobacco comment: 1/3 ppd for past 6-7 months   Substance and Sexual Activity    Alcohol use: Not on file    Drug use: Not on file    Sexual activity: Not on file   Lifestyle    Physical activity:     Days per week: Not on file     Minutes per session: Not on file    Stress: Not on file   Relationships    Social connections:     Talks on phone: Not on file     Gets together: Not on file     Attends Faith service: Not on file     Active member of club or organization: Not on file     Attends meetings of clubs or organizations: Not on file     Relationship status: Not on file    Intimate partner violence:     Fear of current or ex partner: Not on file     Emotionally abused: Not on file     Physically abused: Not on file     Forced sexual activity: Not on file   Other Topics Concern    Not on file   Social History Narrative    Not on file       Vitals:    01/07/20 1131   BP: 118/70   Pulse: 90   SpO2: 100%       Physical Exam  Exam:  Const: Appears chronically ill, under developed, well nourished and fatigued. Appears slightly underweight. Eyes: EOMI in both eyes. PERRL. ENMT: External ears WNL. Perforated tympanic membrane on the left without erythema. No erythema of the tympanic  membrane on the right. External nose WNL. Moistness and normal color of the nasal mucosae. Septum is in the  midline. Full upper and full lower dentures. Gums appear healthy. Posterior pharynx shows cobblestoned  appearance and moderate postnasal drip, but no exudate, irritation or redness. Neck: Supple and symmetric. Palpation reveals no adenopathy. No masses appreciated. Thyroid exhibits no nodule  or thyromegaly. No JVD respiratory patient has severe diminishment of breath sounds. Slight wheeze in the right lower lung. Rhonchi cleared with cough. CV: Rhythm is regular. S1 is normal. S2 is normal. Grade 3/6, systolic murmur. Carotids with bruits bilaterally. Abdominal aorta: not palpable . Bilateral femoral bruit, more prominent on the left than the right. Pedal pulses are poorly  palpable bilaterally.  Extremities: Edema, but no is to be seen back in 3 to 4 months. I will start primidone for essential tremor at nighttime. Patient is advised to use oxygen at nighttime as he does have a tendency to desaturate. He is encouraged to use breathing treatments as indicated. Patient is to be followed with Dr. Luis Grace on an every other week basis. Immunizations are up-to-date.   Cystoscopy planned with Dr. Malorie Valdez in March

## 2020-04-22 RX ORDER — BUDESONIDE 0.5 MG/2ML
500 INHALANT ORAL 2 TIMES DAILY
Qty: 120 AMPULE | Refills: 5 | Status: SHIPPED
Start: 2020-04-22 | End: 2020-05-19 | Stop reason: SDUPTHER

## 2020-04-30 RX ORDER — SUCRALFATE 1 G/1
1 TABLET ORAL 4 TIMES DAILY
Qty: 120 TABLET | Refills: 2 | Status: SHIPPED
Start: 2020-04-30 | End: 2020-05-19 | Stop reason: SDUPTHER

## 2020-05-06 RX ORDER — PRIMIDONE 50 MG/1
50 TABLET ORAL NIGHTLY
Qty: 90 TABLET | Refills: 1 | Status: SHIPPED
Start: 2020-05-06 | End: 2020-05-19 | Stop reason: DRUGHIGH

## 2020-05-19 ENCOUNTER — VIRTUAL VISIT (OUTPATIENT)
Dept: FAMILY MEDICINE CLINIC | Age: 75
End: 2020-05-19
Payer: MEDICARE

## 2020-05-19 VITALS — HEIGHT: 71 IN | BODY MASS INDEX: 19.6 KG/M2 | WEIGHT: 140 LBS

## 2020-05-19 PROBLEM — G25.0 BENIGN ESSENTIAL TREMOR: Status: ACTIVE | Noted: 2020-05-19

## 2020-05-19 PROBLEM — C67.1 MALIGNANT NEOPLASM OF DOME OF URINARY BLADDER (HCC): Status: ACTIVE | Noted: 2020-05-19

## 2020-05-19 PROBLEM — Q27.30 AVM (ARTERIOVENOUS MALFORMATION): Status: ACTIVE | Noted: 2020-05-19

## 2020-05-19 PROCEDURE — G8420 CALC BMI NORM PARAMETERS: HCPCS | Performed by: INTERNAL MEDICINE

## 2020-05-19 PROCEDURE — 3017F COLORECTAL CA SCREEN DOC REV: CPT | Performed by: INTERNAL MEDICINE

## 2020-05-19 PROCEDURE — 99214 OFFICE O/P EST MOD 30 MIN: CPT | Performed by: INTERNAL MEDICINE

## 2020-05-19 PROCEDURE — 1123F ACP DISCUSS/DSCN MKR DOCD: CPT | Performed by: INTERNAL MEDICINE

## 2020-05-19 PROCEDURE — G8427 DOCREV CUR MEDS BY ELIG CLIN: HCPCS | Performed by: INTERNAL MEDICINE

## 2020-05-19 PROCEDURE — G8926 SPIRO NO PERF OR DOC: HCPCS | Performed by: INTERNAL MEDICINE

## 2020-05-19 PROCEDURE — 3023F SPIROM DOC REV: CPT | Performed by: INTERNAL MEDICINE

## 2020-05-19 PROCEDURE — 4040F PNEUMOC VAC/ADMIN/RCVD: CPT | Performed by: INTERNAL MEDICINE

## 2020-05-19 PROCEDURE — 4004F PT TOBACCO SCREEN RCVD TLK: CPT | Performed by: INTERNAL MEDICINE

## 2020-05-19 RX ORDER — ARFORMOTEROL TARTRATE 15 UG/2ML
1 SOLUTION RESPIRATORY (INHALATION) 2 TIMES DAILY
Qty: 120 ML | Refills: 3 | Status: SHIPPED
Start: 2020-05-19 | End: 2020-08-20 | Stop reason: SDUPTHER

## 2020-05-19 RX ORDER — SUCRALFATE 1 G/1
1 TABLET ORAL 4 TIMES DAILY
Qty: 120 TABLET | Refills: 2 | Status: SHIPPED
Start: 2020-05-19 | End: 2020-08-20 | Stop reason: SDUPTHER

## 2020-05-19 RX ORDER — IPRATROPIUM BROMIDE AND ALBUTEROL SULFATE 2.5; .5 MG/3ML; MG/3ML
1 SOLUTION RESPIRATORY (INHALATION) EVERY 4 HOURS
Qty: 360 ML | Refills: 5 | Status: SHIPPED
Start: 2020-05-19 | End: 2020-08-20 | Stop reason: SDUPTHER

## 2020-05-19 RX ORDER — BUDESONIDE 0.5 MG/2ML
500 INHALANT ORAL 2 TIMES DAILY
Qty: 120 AMPULE | Refills: 5 | Status: SHIPPED
Start: 2020-05-19 | End: 2020-08-20 | Stop reason: SDUPTHER

## 2020-05-19 RX ORDER — PRIMIDONE 50 MG/1
50 TABLET ORAL 2 TIMES DAILY
Qty: 60 TABLET | Refills: 5 | Status: SHIPPED
Start: 2020-05-19 | End: 2020-05-28 | Stop reason: SDUPTHER

## 2020-05-19 RX ORDER — TAMSULOSIN HYDROCHLORIDE 0.4 MG/1
0.4 CAPSULE ORAL 2 TIMES DAILY
Qty: 180 CAPSULE | Refills: 1 | Status: SHIPPED
Start: 2020-05-19 | End: 2020-08-20 | Stop reason: SDUPTHER

## 2020-05-19 RX ORDER — PANTOPRAZOLE SODIUM 40 MG/1
40 TABLET, DELAYED RELEASE ORAL DAILY
Qty: 90 TABLET | Refills: 1 | Status: SHIPPED
Start: 2020-05-19 | End: 2020-08-20 | Stop reason: SDUPTHER

## 2020-05-19 NOTE — PROGRESS NOTES
19  Tanner Goss : 1945 Sex: male  Age: 77 y.o. 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 Patient has seen gastroenterology and he states that he did increase his turmeric. Patient is using his rescue inhalers quite a bit and he previously was on a long-acting beta agonist.  He is on a long-acting inhaled steroid. He is using his rescue inhaler up to 6 times per day and I will reincorporate a long-acting beta agonist to see if this lessens his need for this medication. Patient states that he did get some relief of his essential tremor with primidone and he tolerated the medication without problems. I will go ahead and increase that dose to 50 mg twice a day. Patient unfortunately continues to smoke. We discussed this today. Patient also has had no active symptoms of coronary disease other than when his blood counts get very low and then he has a burning in his chest.  He has not had gross blood from his urine and has had cystoscopy within the last 3 months and this is scheduled once again in . Patient does have cataracts and he would like these removed in the near future.     Other         Review of Systems  Health Maintenance:  Influenza Vaccination - (10/2019)  Couseled on Home Safety - (3/16/2018)  Colonoscopy Screening - (2/3/2016)  Colonoscopy - (2011)  Colonoscopy - (2013)  Colonoscopy - (2/3/2016)  Psa Test - (2018)  Prostate Exam - (2018)  Rectal Exam - (2018)  Physical Exam - (2018)  Colonoscopy - (2010) 2016-KOLOZSI  Aorta Screen -   EGD -  RANGEL'S, POSITIVE FOR H PYLORI EMANI ,2013- SEE REPORT, 2014-SEVERE EROSIVE  GASTRITIS-2016-KOLOZSI  Capsule Endoscopy - (2013)  Stress Test - (2014) NORMAL CARDIOLITE  Pneumonia Vaccination - Pneumovax 10/01/14  Prevnar slip given 17  Medical Problems:  Hypertension,

## 2020-05-28 ENCOUNTER — TELEPHONE (OUTPATIENT)
Dept: FAMILY MEDICINE CLINIC | Age: 75
End: 2020-05-28

## 2020-05-28 RX ORDER — PRIMIDONE 50 MG/1
50 TABLET ORAL 2 TIMES DAILY
Qty: 60 TABLET | Refills: 5 | Status: SHIPPED
Start: 2020-05-28 | End: 2020-08-20 | Stop reason: SDUPTHER

## 2020-08-19 ENCOUNTER — OFFICE VISIT (OUTPATIENT)
Dept: FAMILY MEDICINE CLINIC | Age: 75
End: 2020-08-19
Payer: MEDICARE

## 2020-08-19 VITALS
HEART RATE: 93 BPM | HEIGHT: 71 IN | BODY MASS INDEX: 18.76 KG/M2 | SYSTOLIC BLOOD PRESSURE: 126 MMHG | OXYGEN SATURATION: 93 % | TEMPERATURE: 97.5 F | WEIGHT: 134 LBS | DIASTOLIC BLOOD PRESSURE: 80 MMHG | RESPIRATION RATE: 18 BRPM

## 2020-08-19 PROBLEM — H25.013 CORTICAL AGE-RELATED CATARACT OF BOTH EYES: Status: ACTIVE | Noted: 2020-08-19

## 2020-08-19 PROBLEM — C67.9 BLADDER CANCER (HCC): Status: ACTIVE | Noted: 2019-01-01

## 2020-08-19 PROCEDURE — 3017F COLORECTAL CA SCREEN DOC REV: CPT | Performed by: INTERNAL MEDICINE

## 2020-08-19 PROCEDURE — G8420 CALC BMI NORM PARAMETERS: HCPCS | Performed by: INTERNAL MEDICINE

## 2020-08-19 PROCEDURE — 4004F PT TOBACCO SCREEN RCVD TLK: CPT | Performed by: INTERNAL MEDICINE

## 2020-08-19 PROCEDURE — 4040F PNEUMOC VAC/ADMIN/RCVD: CPT | Performed by: INTERNAL MEDICINE

## 2020-08-19 PROCEDURE — 99214 OFFICE O/P EST MOD 30 MIN: CPT | Performed by: INTERNAL MEDICINE

## 2020-08-19 PROCEDURE — G8427 DOCREV CUR MEDS BY ELIG CLIN: HCPCS | Performed by: INTERNAL MEDICINE

## 2020-08-19 PROCEDURE — 1123F ACP DISCUSS/DSCN MKR DOCD: CPT | Performed by: INTERNAL MEDICINE

## 2020-08-19 ASSESSMENT — COPD QUESTIONNAIRES: COPD: 1

## 2020-08-19 NOTE — PROGRESS NOTES
THAN 50% STENOSIS  Carotid Artery Stenosis - 30% 2007, WORSENED 2009  Peripheral Vascular Disease (PVD) - RIGHT SIDED CLAUDICATION SYMPTOMS  Lumbar Radiculopathy - MID 80'S FELL AT WORK  Heme Positive Stools - (2/2011) BRET EMANI- COLONOSCOPY/EGD 2/11  Barrettes Esophagitis - [place] 2011  Colon Polyps - [place] 2/11  Anemia - (1/3/2014) REFERRED BACK TO [place] FOR REPEAT EGD AND POSSIBLE COLONOSCOPY- EGD  SHOWED SEVERE EROSIVE GASTRITIS- PPI BEGUN- CAPSULE AND PUSH ENTEROSCOPY DONE- MULTIPLE  AVM'S  [facility], NOW [facility] 2018  COPD - IMPROVED SYMPTOMS WITH INHALERS-TRIAL ADVAIR 3/21/18  Pneumonia - (2/2014) FEB 2019  Thrombocytopenia - (3/2/2015) WOODS UNDERWAY  Severe Dental Attrition/Erosion - (1/20/2017) REFERRED TO MABEL  Chronic Otitis Media - (2/21/2018) REFERRED TO NENITA  Benign Prostatic Hypertrophy - (3/20/2019) REFERRED TO BLACK  Bladder Cancer- BLACK 2019 BCG next cysto 9/2020  Surgical Hx:  Appendectomy  Ear SX - LIPPY MID 80'S RIGHT  Dental Extraction - (2017)  Reviewed, no changes. FH:  Father:  Non Contributory. Mother:  Non Contributory. Reviewed, no changes. SH: Patient is . Personal Habits: Current smoker. Approximately half a pack per day. Occasionally drinks beer. Drinks on average 2 pots of coffee a  day. Walks daily. HAD ASBETOS EXPOSURE ON/OFF FOR 30 YEARS  Reviewed and updated. Date:8/19/2020  Was the patient queried about smoking behavior? Yes   Does the patient currently smoke? Smoking: Patient is currently smoking.     Current Outpatient Medications:     primidone (MYSOLINE) 50 MG tablet, Take 1 tablet by mouth 2 times daily, Disp: 60 tablet, Rfl: 5    Arformoterol Tartrate (BROVANA) 15 MCG/2ML NEBU, Take 1 ampule by nebulization 2 times daily, Disp: 120 mL, Rfl: 3    sucralfate (CARAFATE) 1 GM tablet, Take 1 tablet by mouth 4 times daily, Disp: 120 tablet, Rfl: 2    budesonide (PULMICORT) 0.5 MG/2ML nebulizer suspension, Take 2 mLs by nebulization 2 times daily, Disp: 120 ampule, Rfl: 5    pantoprazole (PROTONIX) 40 MG tablet, Take 1 tablet by mouth daily, Disp: 90 tablet, Rfl: 1    tamsulosin (FLOMAX) 0.4 MG capsule, Take 1 capsule by mouth 2 times daily, Disp: 180 capsule, Rfl: 1    ipratropium-albuterol (DUONEB) 0.5-2.5 (3) MG/3ML SOLN nebulizer solution, Inhale 3 mLs into the lungs every 4 hours, Disp: 360 mL, Rfl: 5    vitamin D (CHOLECALCIFEROL) 1000 units TABS tablet, Take 1,000 Units by mouth daily, Disp: , Rfl:     cyanocobalamin 1000 MCG tablet, Take 1,000 mcg by mouth daily, Disp: , Rfl:     ferrous sulfate 325 (65 Fe) MG tablet, Take 325 mg by mouth daily (with breakfast), Disp: , Rfl:     Turmeric ([employer] TURMERIC CURCUMIN) 500 MG CAPS, Take 1,000 mg by mouth 2 times daily, Disp: , Rfl:   Allergies   Allergen Reactions    Aspirin      Chronic GI bleeding       Past Medical History:   Diagnosis Date    BPH (benign prostatic hyperplasia) 12/22/2018    COPD (chronic obstructive pulmonary disease) ([facility] [address] 12/22/2018     Past Surgical History:   Procedure Laterality Date    BRONCHOSCOPY      UPPER GASTROINTESTINAL ENDOSCOPY N/A 12/23/2018    EGD DIAGNOSTIC ONLY performed by Pamela Edward MD at [address] [place] [address]     No family history on file.   Social History     Socioeconomic History    Marital status: Unknown     Spouse name: Not on file    Number of children: Not on file    Years of education: Not on file    Highest education level: Not on file   Occupational History    Not on file   Social Needs    Financial resource strain: Not on file    Food insecurity     Worry: Not on file     Inability: Not on file    Transportation needs     Medical: Not on file     Non-medical: Not on file   Tobacco Use    Smoking status: Current Every Day Smoker     Packs/day: 0.25     Years: 54.00     Pack years: 13.50     Types: Cigarettes     Start date: 7/9/1965    Smokeless tobacco: Former User    Tobacco comment: 1/3 ppd for past 6-7 months   Substance and Sexual Activity    Alcohol use: Not on file    Drug use: Not on file    Sexual activity: Not on file   Lifestyle    Physical activity     Days per week: Not on file     Minutes per session: Not on file    Stress: Not on file   Relationships    Social connections     Talks on phone: Not on file     Gets together: Not on file     Attends Latter-day service: Not on file     Active member of club or organization: Not on file     Attends meetings of clubs or organizations: Not on file     Relationship status: Not on file    Intimate partner violence     Fear of current or ex partner: Not on file     Emotionally abused: Not on file     Physically abused: Not on file     Forced sexual activity: Not on file   Other Topics Concern    Not on file   Social History Narrative    Not on file       Vitals:    08/19/20 1059   BP: 126/80   Pulse: 93   Resp: 18   Temp: 97.5 °F (36.4 °C)   TempSrc: Temporal   SpO2: 93%   Weight: 134 lb (60.8 kg)   Height: 5' 11\" (1.803 m)       Physical Exam  Exam:  Const: Appears chronically ill, under developed, well nourished and fatigued. Appears slightly underweight. Eyes: EOMI in both eyes. PERRL. ENMT: External ears WNL. Perforated tympanic membrane on the left without erythema. No erythema of the tympanic  membrane on the right. Neck: Supple and symmetric. Palpation reveals no adenopathy. No masses appreciated. Thyroid exhibits no nodule  or thyromegaly. No JVD respiratory patient has severe diminishment of breath sounds. Slight wheeze in the right lower lung. Rhonchi cleared with cough. CV: Rhythm is regular. S1 is normal. S2 is normal. Grade 3/6, systolic murmur. Carotids with bruits bilaterally. Abdominal aorta: not palpable . Bilateral femoral bruit, more prominent on the left than the right. Pedal pulses are poorly  palpable bilaterally. Extremities: Edema, but no clubbing or cyanosis, woody edema above the knee bilaterally. Abdomen: Bowel sounds are normoactive.  Palpation reveals softness, with no distension, organomegaly or  tenderness. No abdominal masses palpable. No palpable hepatosplenomegaly. Musculo: Walks with a normal gait. Pain to percussion over the vertebral body at T8/T9. No pain to percussion over either rib cage. Upper Extremities: ROM is physiologic. Lower Extremities: Full ROM  bilaterally. Skin: Dry and warm with no rash. Neuro: Alert and oriented x3. Mood is normal. Affect is normal. Speech is articulate and fluent. Reflexes: DTR's are  symmetric, intact and 2+ bilaterally. Tremor with outstretched hands on the left. None on the right. Psych: Patient's attitude is cooperative. Patient's affect is appropriate. Judgement is realistic. Insight is appropriate. Nitza Carias was seen today for pre-op exam and copd. Diagnoses and all orders for this visit:    AVM (arteriovenous malformation)    Cortical age-related cataract of both eyes    Peripheral vascular disease ([address]    Chronic coronary artery disease    Chronic respiratory failure with hypoxia (HCC)    Benign essential tremor    Malignant neoplasm of dome of urinary bladder ([address]    Patient is medically optimized for cataract surgery. Patient is advised that if he would develop coughing sneezing etc. that he needs to notify Dr. Letitia Avina. Patient is not having active coronary symptoms. He is followed weekly by Dr. Femi Simmons and is being transfused.

## 2020-08-20 RX ORDER — PRIMIDONE 50 MG/1
50 TABLET ORAL 2 TIMES DAILY
Qty: 60 TABLET | Refills: 5 | Status: SHIPPED
Start: 2020-08-20 | End: 2021-03-18 | Stop reason: SDUPTHER

## 2020-08-20 RX ORDER — IPRATROPIUM BROMIDE AND ALBUTEROL SULFATE 2.5; .5 MG/3ML; MG/3ML
1 SOLUTION RESPIRATORY (INHALATION) EVERY 4 HOURS
Qty: 360 ML | Refills: 5 | Status: SHIPPED
Start: 2020-08-20 | End: 2021-03-18 | Stop reason: SDUPTHER

## 2020-08-20 RX ORDER — BUDESONIDE 0.5 MG/2ML
500 INHALANT ORAL 2 TIMES DAILY
Qty: 120 AMPULE | Refills: 5 | Status: SHIPPED
Start: 2020-08-20 | End: 2021-03-18 | Stop reason: SDUPTHER

## 2020-08-20 RX ORDER — SUCRALFATE 1 G/1
1 TABLET ORAL 4 TIMES DAILY
Qty: 120 TABLET | Refills: 2 | Status: SHIPPED
Start: 2020-08-20 | End: 2020-11-16

## 2020-08-20 RX ORDER — PANTOPRAZOLE SODIUM 40 MG/1
40 TABLET, DELAYED RELEASE ORAL DAILY
Qty: 90 TABLET | Refills: 1 | Status: SHIPPED
Start: 2020-08-20 | End: 2021-03-18 | Stop reason: SDUPTHER

## 2020-08-20 RX ORDER — ARFORMOTEROL TARTRATE 15 UG/2ML
1 SOLUTION RESPIRATORY (INHALATION) 2 TIMES DAILY
Qty: 120 ML | Refills: 3 | Status: SHIPPED
Start: 2020-08-20 | End: 2020-09-25 | Stop reason: SDUPTHER

## 2020-08-20 RX ORDER — TAMSULOSIN HYDROCHLORIDE 0.4 MG/1
0.4 CAPSULE ORAL 2 TIMES DAILY
Qty: 180 CAPSULE | Refills: 1 | Status: SHIPPED
Start: 2020-08-20 | End: 2021-03-18 | Stop reason: SDUPTHER

## 2020-08-21 ENCOUNTER — TELEPHONE (OUTPATIENT)
Dept: FAMILY MEDICINE CLINIC | Age: 75
End: 2020-08-21

## 2020-08-21 NOTE — TELEPHONE ENCOUNTER
The patient is not able to use any inhaler. Previously tried but was unable to do this. It was felt by pulmonary medicine that the patient should be on nebulized medication.

## 2020-08-25 NOTE — TELEPHONE ENCOUNTER
Called [employer]. This was denied because the pt is not in a nursing home.    I sent over a letter of medical necessity to the appeals dept at the number that was provided to me

## 2020-08-27 NOTE — TELEPHONE ENCOUNTER
Notified Vanita/pharmacy will fax over a [address] form to the phone room to be filled out and signed by doctor. This form will need filled out and sent back so that the pharmacy can bill Medicare Part B. Pharmacy will give an emergency supply. Left detailed message for daughterElmo Drivers [phone].

## 2020-09-25 ENCOUNTER — TELEPHONE (OUTPATIENT)
Dept: ADMINISTRATIVE | Age: 75
End: 2020-09-25

## 2020-09-25 RX ORDER — ARFORMOTEROL TARTRATE 15 UG/2ML
1 SOLUTION RESPIRATORY (INHALATION) 2 TIMES DAILY
Qty: 120 ML | Refills: 3 | Status: SHIPPED
Start: 2020-09-25 | End: 2020-09-30 | Stop reason: SDUPTHER

## 2020-09-25 NOTE — TELEPHONE ENCOUNTER
Last Appointment:  8/19/2020  Future Appointments   Date Time Provider Liza العراقي   12/7/2020 10:45 AM Hussain Renee MD [facility]      Refill requested.

## 2020-09-30 ENCOUNTER — TELEPHONE (OUTPATIENT)
Dept: FAMILY MEDICINE CLINIC | Age: 75
End: 2020-09-30

## 2020-09-30 RX ORDER — ARFORMOTEROL TARTRATE 15 UG/2ML
1 SOLUTION RESPIRATORY (INHALATION) 2 TIMES DAILY
Qty: 120 ML | Refills: 3 | Status: SHIPPED | OUTPATIENT
Start: 2020-09-30

## 2020-09-30 NOTE — TELEPHONE ENCOUNTER
Patients daughter calling to tell you that [address] [place] [address] has faxed you something to fill out for his nebulizer meds. She is asking to expedite this because he will be out tomorrow.

## 2020-10-02 ENCOUNTER — TELEPHONE (OUTPATIENT)
Dept: FAMILY MEDICINE CLINIC | Age: 75
End: 2020-10-02

## 2020-10-29 ENCOUNTER — TELEPHONE (OUTPATIENT)
Dept: FAMILY MEDICINE CLINIC | Age: 75
End: 2020-10-29

## 2020-10-29 NOTE — TELEPHONE ENCOUNTER
Daughter calling please look out for a fax from pharmacy to assist filling brovana.  A form will need signed and faxed back for him to refill

## 2020-11-16 RX ORDER — SUCRALFATE 1 G/1
TABLET ORAL
Qty: 120 TABLET | Refills: 2 | Status: SHIPPED
Start: 2020-11-16 | End: 2021-02-18

## 2020-11-16 NOTE — TELEPHONE ENCOUNTER
Last Appointment:  8/19/2020  Future Appointments   Date Time Provider Liza العراقي   12/7/2020 10:45 AM Josy Velasco MD [facility]

## 2021-02-18 DIAGNOSIS — D64.9 CHRONIC ANEMIA: ICD-10-CM

## 2021-02-18 RX ORDER — SUCRALFATE 1 G/1
TABLET ORAL
Qty: 120 TABLET | Refills: 5 | Status: SHIPPED | OUTPATIENT
Start: 2021-02-18

## 2021-02-26 LAB
HCT VFR BLD CALC: 27.4 % (ref 41–50)
HEMOGLOBIN: 8.7 G/DL (ref 13.5–16.5)

## 2021-03-18 DIAGNOSIS — D64.9 CHRONIC ANEMIA: ICD-10-CM

## 2021-03-18 DIAGNOSIS — J43.2 CENTRILOBULAR EMPHYSEMA (HCC): Chronic | ICD-10-CM

## 2021-03-18 RX ORDER — IPRATROPIUM BROMIDE AND ALBUTEROL SULFATE 2.5; .5 MG/3ML; MG/3ML
1 SOLUTION RESPIRATORY (INHALATION) EVERY 4 HOURS
Qty: 360 ML | Refills: 0 | Status: SHIPPED
Start: 2021-03-18 | End: 2021-04-14 | Stop reason: SDUPTHER

## 2021-03-18 RX ORDER — PANTOPRAZOLE SODIUM 40 MG/1
40 TABLET, DELAYED RELEASE ORAL DAILY
Qty: 30 TABLET | Refills: 0 | Status: SHIPPED | OUTPATIENT
Start: 2021-03-18

## 2021-03-18 RX ORDER — PRIMIDONE 50 MG/1
50 TABLET ORAL 2 TIMES DAILY
Qty: 60 TABLET | Refills: 0 | Status: SHIPPED
Start: 2021-03-18 | End: 2021-04-09

## 2021-03-18 RX ORDER — TAMSULOSIN HYDROCHLORIDE 0.4 MG/1
0.4 CAPSULE ORAL 2 TIMES DAILY
Qty: 60 CAPSULE | Refills: 0 | Status: SHIPPED
Start: 2021-03-18 | End: 2021-04-14 | Stop reason: SDUPTHER

## 2021-03-18 RX ORDER — BUDESONIDE 0.5 MG/2ML
500 INHALANT ORAL 2 TIMES DAILY
Qty: 180 AMPULE | Refills: 0 | Status: SHIPPED
Start: 2021-03-18 | End: 2021-04-14 | Stop reason: SDUPTHER

## 2021-03-31 ENCOUNTER — TELEPHONE (OUTPATIENT)
Dept: PRIMARY CARE CLINIC | Age: 76
End: 2021-03-31

## 2021-03-31 NOTE — TELEPHONE ENCOUNTER
Daughter called in stating she has been trying to get patients brovana filled. Pharm states they faxed a DWO form to both locations. I looked in your mailbox here in [place] and did not see anything. Do you have anything in [place] for him?

## 2021-04-09 RX ORDER — PRIMIDONE 50 MG/1
TABLET ORAL
Qty: 60 TABLET | Refills: 5 | Status: SHIPPED
Start: 2021-04-09 | End: 2021-04-14 | Stop reason: ALTCHOICE

## 2021-04-09 NOTE — TELEPHONE ENCOUNTER
Last Appointment:  8/19/2020  Future Appointments   Date Time Provider Liza العراقي   4/14/2021  3:15 PM Merlin Sanchez  [address] [facility] [address]

## 2021-04-14 ENCOUNTER — OFFICE VISIT (OUTPATIENT)
Dept: FAMILY MEDICINE CLINIC | Age: 76
End: 2021-04-14
Payer: MEDICARE

## 2021-04-14 VITALS
TEMPERATURE: 99.1 F | SYSTOLIC BLOOD PRESSURE: 130 MMHG | HEART RATE: 54 BPM | DIASTOLIC BLOOD PRESSURE: 80 MMHG | OXYGEN SATURATION: 87 %

## 2021-04-14 DIAGNOSIS — D50.0 IRON DEFICIENCY ANEMIA DUE TO CHRONIC BLOOD LOSS: ICD-10-CM

## 2021-04-14 DIAGNOSIS — C61 PROSTATE CANCER (HCC): ICD-10-CM

## 2021-04-14 DIAGNOSIS — J44.1 COPD EXACERBATION (HCC): ICD-10-CM

## 2021-04-14 DIAGNOSIS — J43.2 CENTRILOBULAR EMPHYSEMA (HCC): Primary | Chronic | ICD-10-CM

## 2021-04-14 DIAGNOSIS — C67.1 MALIGNANT NEOPLASM OF DOME OF URINARY BLADDER (HCC): ICD-10-CM

## 2021-04-14 DIAGNOSIS — I25.10 CHRONIC CORONARY ARTERY DISEASE: ICD-10-CM

## 2021-04-14 DIAGNOSIS — I73.9 PERIPHERAL VASCULAR DISEASE (HCC): ICD-10-CM

## 2021-04-14 PROCEDURE — 4040F PNEUMOC VAC/ADMIN/RCVD: CPT | Performed by: INTERNAL MEDICINE

## 2021-04-14 PROCEDURE — G8427 DOCREV CUR MEDS BY ELIG CLIN: HCPCS | Performed by: INTERNAL MEDICINE

## 2021-04-14 PROCEDURE — 3023F SPIROM DOC REV: CPT | Performed by: INTERNAL MEDICINE

## 2021-04-14 PROCEDURE — G8926 SPIRO NO PERF OR DOC: HCPCS | Performed by: INTERNAL MEDICINE

## 2021-04-14 PROCEDURE — 3017F COLORECTAL CA SCREEN DOC REV: CPT | Performed by: INTERNAL MEDICINE

## 2021-04-14 PROCEDURE — 4004F PT TOBACCO SCREEN RCVD TLK: CPT | Performed by: INTERNAL MEDICINE

## 2021-04-14 PROCEDURE — G8420 CALC BMI NORM PARAMETERS: HCPCS | Performed by: INTERNAL MEDICINE

## 2021-04-14 PROCEDURE — 99215 OFFICE O/P EST HI 40 MIN: CPT | Performed by: INTERNAL MEDICINE

## 2021-04-14 PROCEDURE — 1123F ACP DISCUSS/DSCN MKR DOCD: CPT | Performed by: INTERNAL MEDICINE

## 2021-04-14 RX ORDER — IPRATROPIUM BROMIDE AND ALBUTEROL SULFATE 2.5; .5 MG/3ML; MG/3ML
1 SOLUTION RESPIRATORY (INHALATION) EVERY 4 HOURS
Qty: 360 ML | Refills: 1 | Status: SHIPPED | OUTPATIENT
Start: 2021-04-14

## 2021-04-14 RX ORDER — PREDNISONE 20 MG/1
60 TABLET ORAL DAILY
Qty: 30 TABLET | Refills: 0 | Status: SHIPPED | OUTPATIENT
Start: 2021-04-14 | End: 2021-04-24

## 2021-04-14 RX ORDER — BUDESONIDE 0.5 MG/2ML
500 INHALANT ORAL 2 TIMES DAILY
Qty: 180 AMPULE | Refills: 1 | Status: SHIPPED | OUTPATIENT
Start: 2021-04-14

## 2021-04-14 RX ORDER — PRIMIDONE 50 MG/1
100 TABLET ORAL 2 TIMES DAILY
Qty: 120 TABLET | Refills: 5 | Status: SHIPPED | OUTPATIENT
Start: 2021-04-14

## 2021-04-14 RX ORDER — AMOXICILLIN AND CLAVULANATE POTASSIUM 875; 125 MG/1; MG/1
1 TABLET, FILM COATED ORAL 2 TIMES DAILY
Qty: 20 TABLET | Refills: 0 | Status: SHIPPED | OUTPATIENT
Start: 2021-04-14 | End: 2021-04-24

## 2021-04-14 RX ORDER — TAMSULOSIN HYDROCHLORIDE 0.4 MG/1
0.4 CAPSULE ORAL 2 TIMES DAILY
Qty: 60 CAPSULE | Refills: 5 | Status: SHIPPED | OUTPATIENT
Start: 2021-04-14

## 2021-04-14 ASSESSMENT — COPD QUESTIONNAIRES: COPD: 1

## 2021-04-14 NOTE — PROGRESS NOTES
19  Zohra Campbell : 1945 Sex: male  Age: 77 y.o. Chief Complaint   Patient presents with    Other     refills       This is a patient who presents with increased cough and sputum production. Dates that he is actually been going downhill for the last 2 months. He is followed by Dr. Didier Joshua and gets blood work on a weekly basis regarding his iron deficiency anemia. This is secondary to chronic GI blood loss from AV malformations. Patient did see Dr. Valeri Baird today regarding his bladder cancer. There appears to be no recurrence and the patient will follow up in 6 months. The patient seems to be a bit depressed and I think is kind of given up. He is not taking his mini nebs on a regular basis. He does do the a.m. and p.m. long-acting medications but does not normally take his DuoNeb's. I emphasized to him that we need to get this under better control. His daughter was present.     COPD    Other        Review of Systems  Health Maintenance:  Influenza Vaccination - (10/2020)  Couseled on Home Safety - (3/16/2018)  Colonoscopy Screening - (2/3/2016)  Colonoscopy - (2011)  Colonoscopy - (2013)  Colonoscopy - (2/3/2016)  Psa Test - (2018)  Prostate Exam - (2018)  Rectal Exam - (2018)  Physical Exam - 2021  Colonoscopy - (2010) 2016-KOLOZSI  Aorta Screen -   EGD -  RANGEL'S, POSITIVE FOR H PYLORI EMANI ,2013- SEE REPORT, 2014-SEVERE EROSIVE  GASTRITIS-2016-KOLOZSI  Capsule Endoscopy - (2013)  Stress Test - (2014) NORMAL CARDIOLITE  Pneumonia Vaccination - Pneumovax 10/01/14  Prevnar slip given 17  COVID VACCINE 3/2021  Medical Problems:  Hypertension, Hyperlidemia  Tobacco Abuse - (2018) COUNSELING EVERY VISIT  Coronary Artery Disease (CAD) - [place]  CATH IN [place] BY [facility]ER LESS THAN 50% STENOSIS  Carotid Artery Stenosis - 30% , WORSENED   Peripheral Vascular Disease (PVD) - RIGHT SIDED CLAUDICATION SYMPTOMS  Lumbar Radiculopathy - MID [de-identified] FELL AT WORK  Heme Positive Stools - (2/2011) M EMANI- COLONOSCOPY/EGD 2/11  Barrettes Esophagitis - [place] 2011  Colon Polyps - [place] 2/11  Anemia - (1/3/2014) REFERRED BACK TO [place] FOR REPEAT EGD AND POSSIBLE COLONOSCOPY- EGD  SHOWED SEVERE EROSIVE GASTRITIS- PPI BEGUN- CAPSULE AND PUSH ENTEROSCOPY DONE- MULTIPLE  AVM'S  [facility] [place] 2018  COPD - IMPROVED SYMPTOMS WITH INHALERS-TRIAL ADVAIR 3/21/18  Pneumonia - (2/2014) FEB 2019  Thrombocytopenia - (3/2/2015) WOODS UNDERWAY  Severe Dental Attrition/Erosion - (1/20/2017) REFERRED TO MABEL  Chronic Otitis Media - (2/21/2018) REFERRED TO NENITA  Benign Prostatic Hypertrophy - (3/20/2019) REFERRED TO BL[facility]  Bladder Cancer- BLACK 2019 BCG next cysto 9/2020  Surgical Hx:  Appendectomy  Ear SX - LIPPY MID 80'S RIGHT  Dental Extraction - (2017)  Reviewed, no changes. FH:  Father:  Non Contributory. Mother:  Non Contributory. Reviewed, no changes. SH: Patient is . Personal Habits: Current smoker. Approximately half a pack per day. Occasionally drinks beer. Drinks on average 2 pots of coffee a  day. Does not ambulate frequently. Usually uses a wheelchair for transportation. Inda Brittle HAD ASBETOS EXPOSURE ON/OFF FOR 30 YEARS  Reviewed and updated. Date:4/14/2021  Was the patient queried about smoking behavior? Yes   Does the patient currently smoke? Smoking: Patient is currently smoking.   States he smokes anywhere from 5 to 7 cigarettes/day    Current Outpatient Medications:     tamsulosin (FLOMAX) 0.4 MG capsule, Take 1 capsule by mouth 2 times daily, Disp: 60 capsule, Rfl: 5    budesonide (PULMICORT) 0.5 MG/2ML nebulizer suspension, Take 2 mLs by nebulization 2 times daily, Disp: 180 ampule, Rfl: 1    ipratropium-albuterol (DUONEB) 0.5-2.5 (3) MG/3ML SOLN nebulizer solution, Inhale 3 mLs into the lungs every 4 hours, Disp: 360 mL, Rfl: 1    amoxicillin-clavulanate (AUGMENTIN) 875-125 MG per tablet, Take 1 tablet by mouth 2 Smoking status: Current Every Day Smoker     Packs/day: 0.25     Years: 54.00     Pack years: 13.50     Types: Cigarettes     Start date: 7/9/1965    Smokeless tobacco: Former User    Tobacco comment: 1/3 ppd for past 6-7 months   Substance and Sexual Activity    Alcohol use: Not on file    Drug use: Not on file    Sexual activity: Not on file   Lifestyle    Physical activity     Days per week: Not on file     Minutes per session: Not on file    Stress: Not on file   Relationships    Social connections     Talks on phone: Not on file     Gets together: Not on file     Attends Baptism service: Not on file     Active member of club or organization: Not on file     Attends meetings of clubs or organizations: Not on file     Relationship status: Not on file    Intimate partner violence     Fear of current or ex partner: Not on file     Emotionally abused: Not on file     Physically abused: Not on file     Forced sexual activity: Not on file   Other Topics Concern    Not on file   Social History Narrative    Not on file       Vitals:    04/14/21 1541   BP: 130/80   Pulse: 54   Temp: 99.1 °F (37.3 °C)   SpO2: (!) 87%       Physical Exam  Exam:  Const: Appears chronically ill, under developed, well nourished and fatigued. Appears slightly underweight. Eyes: EOMI in both eyes. PERRL. ENMT: External ears WNL. Perforated tympanic membrane on the left without erythema. No erythema of the tympanic  membrane on the right. Neck: Supple and symmetric. Palpation reveals no adenopathy. No masses appreciated. Thyroid exhibits no nodule  or thyromegaly. RESPIRATORY: patient has severe diminishment of breath sounds. Rhonchorous sounds right base. Diffuse wheeze throughout all lung fields  CV: Rhythm is regular. S1 is normal. S2 is normal. Grade 3/6, systolic murmur. Carotids with bruits bilaterally. Abdominal aorta: not palpable . Bilateral femoral bruit, more prominent on the left than the right.  Pedal pulses are poorly  palpable bilaterally. Extremities: Edema, but no clubbing or cyanosis, woody edema above the knee bilaterally. Abdomen: Bowel sounds are normoactive. Palpation reveals softness, with no distension, organomegaly or  tenderness. No abdominal masses palpable. No palpable hepatosplenomegaly. Musculo: Walks with a normal gait. Pain to percussion over the vertebral body at T8/T9. No pain to percussion over either rib cage. Upper Extremities: ROM is physiologic. Lower Extremities: Full ROM  bilaterally. Skin: Dry and warm with no rash. Neuro: Alert and oriented x3. Mood is normal. Affect is normal. Speech is articulate and fluent. Reflexes: DTR's are  symmetric, intact and 2+ bilaterally. Tremor with outstretched hands on the left. None on the right. Psych: Patient's attitude is cooperative. Patient's affect is appropriate. Judgement is realistic. Insight is appropriate. [facility] was seen today for other. Diagnoses and all orders for this visit:    Centrilobular emphysema ([facility] [address]    Malignant neoplasm of dome of urinary bladder ([facility] [address]    Peripheral vascular disease ([address]    Chronic coronary artery disease    Prostate cancer ([facility] [address]    Iron deficiency anemia due to chronic blood loss    COPD exacerbation (HCC)  -     XR CHEST STANDARD (2 VW); Future    Other orders  -     tamsulosin (FLOMAX) 0.4 MG capsule; Take 1 capsule by mouth 2 times daily  -     budesonide (PULMICORT) 0.5 MG/2ML nebulizer suspension; Take 2 mLs by nebulization 2 times daily  -     ipratropium-albuterol (DUONEB) 0.5-2.5 (3) MG/3ML SOLN nebulizer solution; Inhale 3 mLs into the lungs every 4 hours  -     amoxicillin-clavulanate (AUGMENTIN) 875-125 MG per tablet; Take 1 tablet by mouth 2 times daily for 10 days  -     predniSONE (DELTASONE) 20 MG tablet; Take 3 tablets by mouth daily for 10 days  -     primidone (MYSOLINE) 50 MG tablet;  Take 2 tablets by mouth 2 times daily    Initially because of desaturation I thought the patient might

## 2021-04-26 ENCOUNTER — OFFICE VISIT (OUTPATIENT)
Dept: PRIMARY CARE CLINIC | Age: 76
End: 2021-04-26
Payer: MEDICARE

## 2021-04-26 VITALS
SYSTOLIC BLOOD PRESSURE: 122 MMHG | TEMPERATURE: 97.6 F | BODY MASS INDEX: 20.16 KG/M2 | DIASTOLIC BLOOD PRESSURE: 62 MMHG | OXYGEN SATURATION: 96 % | HEIGHT: 71 IN | WEIGHT: 144 LBS

## 2021-04-26 DIAGNOSIS — C67.8 MALIGNANT NEOPLASM OF OVERLAPPING SITES OF BLADDER (HCC): ICD-10-CM

## 2021-04-26 DIAGNOSIS — D50.0 IRON DEFICIENCY ANEMIA DUE TO CHRONIC BLOOD LOSS: ICD-10-CM

## 2021-04-26 DIAGNOSIS — G25.0 BENIGN ESSENTIAL TREMOR: ICD-10-CM

## 2021-04-26 DIAGNOSIS — J44.1 COPD EXACERBATION (HCC): Primary | ICD-10-CM

## 2021-04-26 DIAGNOSIS — Z72.0 TOBACCO USER: ICD-10-CM

## 2021-04-26 DIAGNOSIS — R09.02 HYPOXIA: ICD-10-CM

## 2021-04-26 DIAGNOSIS — C61 PROSTATE CANCER (HCC): ICD-10-CM

## 2021-04-26 DIAGNOSIS — I25.10 CHRONIC CORONARY ARTERY DISEASE: ICD-10-CM

## 2021-04-26 DIAGNOSIS — Q27.30 AVM (ARTERIOVENOUS MALFORMATION): ICD-10-CM

## 2021-04-26 DIAGNOSIS — I89.0 LYMPHEDEMA: ICD-10-CM

## 2021-04-26 LAB — SARS-COV-2: NORMAL

## 2021-04-26 PROCEDURE — 99215 OFFICE O/P EST HI 40 MIN: CPT | Performed by: INTERNAL MEDICINE

## 2021-04-26 PROCEDURE — 1123F ACP DISCUSS/DSCN MKR DOCD: CPT | Performed by: INTERNAL MEDICINE

## 2021-04-26 PROCEDURE — 3023F SPIROM DOC REV: CPT | Performed by: INTERNAL MEDICINE

## 2021-04-26 PROCEDURE — 4040F PNEUMOC VAC/ADMIN/RCVD: CPT | Performed by: INTERNAL MEDICINE

## 2021-04-26 PROCEDURE — G8926 SPIRO NO PERF OR DOC: HCPCS | Performed by: INTERNAL MEDICINE

## 2021-04-26 PROCEDURE — G8420 CALC BMI NORM PARAMETERS: HCPCS | Performed by: INTERNAL MEDICINE

## 2021-04-26 PROCEDURE — 4004F PT TOBACCO SCREEN RCVD TLK: CPT | Performed by: INTERNAL MEDICINE

## 2021-04-26 PROCEDURE — G8427 DOCREV CUR MEDS BY ELIG CLIN: HCPCS | Performed by: INTERNAL MEDICINE

## 2021-04-26 PROCEDURE — 3017F COLORECTAL CA SCREEN DOC REV: CPT | Performed by: INTERNAL MEDICINE

## 2021-04-26 SDOH — ECONOMIC STABILITY: FOOD INSECURITY: WITHIN THE PAST 12 MONTHS, THE FOOD YOU BOUGHT JUST DIDN'T LAST AND YOU DIDN'T HAVE MONEY TO GET MORE.: NEVER TRUE

## 2021-04-26 SDOH — ECONOMIC STABILITY: FOOD INSECURITY: WITHIN THE PAST 12 MONTHS, YOU WORRIED THAT YOUR FOOD WOULD RUN OUT BEFORE YOU GOT MONEY TO BUY MORE.: NEVER TRUE

## 2021-04-26 SDOH — ECONOMIC STABILITY: TRANSPORTATION INSECURITY
IN THE PAST 12 MONTHS, HAS THE LACK OF TRANSPORTATION KEPT YOU FROM MEDICAL APPOINTMENTS OR FROM GETTING MEDICATIONS?: NOT ASKED

## 2021-04-26 SDOH — ECONOMIC STABILITY: TRANSPORTATION INSECURITY
IN THE PAST 12 MONTHS, HAS LACK OF TRANSPORTATION KEPT YOU FROM MEETINGS, WORK, OR FROM GETTING THINGS NEEDED FOR DAILY LIVING?: NOT ASKED

## 2021-04-26 ASSESSMENT — COPD QUESTIONNAIRES: COPD: 1

## 2021-04-26 NOTE — PROGRESS NOTES
19  Clarance Necessary : 1945 Sex: male  Age: 77 y.o. No chief complaint on file. Is a patient who was seen on  with an acute exacerbation of COPD. He had increased cough and sputum production. Chest x-ray at that time did not show an increased infiltrate. He did have some pleural scarring on the left-hand side. Patient normally follows with Dr. Oralia Killian. Patient states he is made no improvement. He was placed on high-dose steroid, his nebulized medication was increased, and an antibiotic was given. Patient does follow with Dr. Brenda Fregoso for chronic anemia which is iron deficient. This is secondary to chronic AV malformation leakage. Most recent hemoglobin was 7.1 and he did receive 2 units of packed red blood cells last Friday. He states that this really did not help his breathing issue. Unfortunately the patient continues to smoke. We have had many long discussions regarding smoking cessation but he states his nerves do not allow him to stop smoking. Patient is denying orthopnea or PND. He usually does sleep in a lounge chair however. He does have chronic lymphedema of the lower extremity but this actually is improved from past visits.     Other    COPD        Review of Systems  Health Maintenance:  Influenza Vaccination - (10/2020)  Couseled on Home Safety - (3/16/2018)  Colonoscopy Screening - (2/3/2016)  Colonoscopy - (2011)  Colonoscopy - (2013)  Colonoscopy - (2/3/2016)  Psa Test - (2018)  Prostate Exam - (2018)  Rectal Exam - (2018)  Physical Exam - 2021  Colonoscopy - (2010) -KOLOZSI  Aorta Screen -   EGD -  RANGEL'S, POSITIVE FOR H PYLORI EMANI ,- SEE REPORT, 2014-SEVERE EROSIVE  GASTRITIS-2016-KOLOZSI  Capsule Endoscopy - (2013)  Stress Test - (2014) NORMAL CARDIOLITE  Pneumonia Vaccination - Pneumovax 10/01/14  Prevnar slip given 17  COVID VACCINE 3/2021  Medical Problems:  Hypertension, Hyperlidemia  Tobacco Abuse - (2/21/2018) COUNSELING EVERY VISIT  Coronary Artery Disease (CAD) - MI 1994 CATH IN [place] BY [facility] LESS THAN 50% STENOSIS  Carotid Artery Stenosis - 30% 2007, WORSENED 2009  Peripheral Vascular Disease (PVD) - RIGHT SIDED CLAUDICATION SYMPTOMS  Lumbar Radiculopathy - MID 80'S FELL AT WORK  Heme Positive Stools - (2/2011) M EMANI- COLONOSCOPY/EGD 2/11  Barrettes Esophagitis - [place] 2011  Colon Polyps - [place] 2/11  Anemia - (1/3/2014) REFERRED BACK TO [place] FOR REPEAT EGD AND POSSIBLE COLONOSCOPY- EGD  SHOWED SEVERE EROSIVE GASTRITIS- PPI BEGUN- CAPSULE AND PUSH ENTEROSCOPY DONE- MULTIPLE  AVM'S  [facility] 2018  COPD - IMPROVED SYMPTOMS WITH INHALERS-TRIAL ADVAIR 3/21/18  Pneumonia - (2/2014) FEB 2019  Thrombocytopenia - (3/2/2015) WOODS UNDERWAY  Severe Dental Attrition/Erosion - (1/20/2017) REFERRED TO [facility]  Chronic Otitis Media - (2/21/2018) REFERRED TO [facility]  Benign Prostatic Hypertrophy - (3/20/2019) REFERRED TO [facility]  Bladder Cancer- [facility]ACK 2019 [facility] next cysto 9/2020  COPD exacerbation 4/26/2021  Surgical Hx:  Appendectomy  Ear SX - LIPPY MID 80'S RIGHT  Dental Extraction - (2017)  Reviewed, no changes. FH:  Father:  Non Contributory. Mother:  Non Contributory. Reviewed, no changes. SH: Patient is . Personal Habits: Current smoker. Approximately half a pack per day. Occasionally drinks beer. Drinks on average 2 pots of coffee a  day. Does not ambulate frequently. Usually uses a wheelchair for transportation. Venancio Keen HAD ASBETOS EXPOSURE ON/OFF FOR 30 YEARS  Reviewed and updated. Date:4/26/2021  Was the patient queried about smoking behavior? Yes   Does the patient currently smoke? Smoking: Patient is currently smoking.   States he smokes anywhere from 5 to 7 cigarettes/day    Current Outpatient Medications:     tamsulosin (FLOMAX) 0.4 MG capsule, Take 1 capsule by mouth 2 times daily, Disp: 60 capsule, Rfl: 5    budesonide (PULMICORT) 0.5 MG/2ML nebulizer suspension, Take 2 mLs by nebulization 2 times daily, Disp: 180 ampule, Rfl: 1    ipratropium-albuterol (DUONEB) 0.5-2.5 (3) MG/3ML SOLN nebulizer solution, Inhale 3 mLs into the lungs every 4 hours, Disp: 360 mL, Rfl: 1    primidone (MYSOLINE) 50 MG tablet, Take 2 tablets by mouth 2 times daily, Disp: 120 tablet, Rfl: 5    pantoprazole (PROTONIX) 40 MG tablet, Take 1 tablet by mouth daily, Disp: 30 tablet, Rfl: 0    sucralfate (CARAFATE) 1 GM tablet, take 1 tablet by mouth four times a day, Disp: 120 tablet, Rfl: 5    Arformoterol Tartrate (BROVANA) 15 MCG/2ML NEBU, Take 1 ampule by nebulization 2 times daily, Disp: 120 mL, Rfl: 3    vitamin D (CHOLECALCIFEROL) 1000 units TABS tablet, Take 1,000 Units by mouth daily, Disp: , Rfl:     cyanocobalamin 1000 MCG tablet, Take 1,000 mcg by mouth daily, Disp: , Rfl:     ferrous sulfate 325 (65 Fe) MG tablet, Take 325 mg by mouth daily (with breakfast), Disp: , Rfl:     Turmeric ([employer] TURMERIC CURCUMIN) 500 MG CAPS, Take 1,000 mg by mouth 2 times daily, Disp: , Rfl:   Allergies   Allergen Reactions    Aspirin      Chronic GI bleeding       Past Medical History:   Diagnosis Date    Bladder cancer ([facility] 2019    Black    BPH (benign prostatic hyperplasia) 12/22/2018    [facility] 12/22/2018     Past Surgical History:   Procedure Laterality Date    BRONCHOSCOPY      UPPER GASTROINTESTINAL ENDOSCOPY N/A 12/23/2018    EGD DIAGNOSTIC ONLY performed by Jordan Gonzalez MD at [address] [place] [address]     No family history on file.   Social History     Socioeconomic History    Marital status: Unknown     Spouse name: Not on file    Number of children: Not on file    Years of education: Not on file    Highest education level: Not on file   Occupational History    Not on file   Social Needs    Financial resource strain: Not hard at all    Food insecurity     Worry: Never true     Inability: Never true   [employer] needs     Medical: Not on file     Non-medical: Not on file   Tobacco Use    Smoking status: Current Every Day Smoker     Packs/day: 0.25     Years: 54.00     Pack years: 13.50     Types: Cigarettes     Start date: 7/9/1965    Smokeless tobacco: Former User    Tobacco comment: 1/3 ppd for past 6-7 months   Substance and Sexual Activity    Alcohol use: Not on file    Drug use: Not on file    Sexual activity: Not on file   Lifestyle    Physical activity     Days per week: Not on file     Minutes per session: Not on file    Stress: Not on file   Relationships    Social connections     Talks on phone: Not on file     Gets together: Not on file     Attends [employer] service: Not on file     Active member of club or organization: Not on file     Attends meetings of clubs or organizations: Not on file     Relationship status: Not on file    Intimate partner violence     Fear of current or ex partner: Not on file     Emotionally abused: Not on file     Physically abused: Not on file     Forced sexual activity: Not on file   Other Topics Concern    Not on file   Social History Narrative    Not on file       Vitals:    04/26/21 1603   BP: 122/62   Temp: 97.6 °F (36.4 °C)   SpO2: 96%   Weight: 144 lb (65.3 kg)   Height: 5' 11\" (1.803 m)       Physical Exam  Exam:  Const: Appears chronically ill, under developed, well nourished and fatigued. Appears slightly underweight. Eyes: EOMI in both eyes. PERRL. ENMT: External ears WNL. Perforated tympanic membrane on the left without erythema. No erythema of the tympanic  membrane on the right. Neck: Supple and symmetric. Palpation reveals no adenopathy. No masses appreciated. Thyroid exhibits no nodule  or thyromegaly. RESPIRATORY: patient has severe diminishment of breath sounds. Inspiratory and expiratory wheezing in all lung fields both anteriorly and posteriorly. No rhonchorous sounds detected at the base. CV: Rhythm is regular.  S1 is normal. S2 is normal. Grade 3/6, systolic murmur. Carotids with bruits bilaterally. Abdominal aorta: not palpable . Bilateral femoral bruit, more prominent on the left than the right. Pedal pulses are poorly  palpable bilaterally. Extremities: Edema, but no clubbing or cyanosis, woody edema above the knee bilaterally. Abdomen: Bowel sounds are normoactive. Palpation reveals softness, with no distension, organomegaly or  tenderness. No abdominal masses palpable. No palpable hepatosplenomegaly. Musculo: Walks with a normal gait. Pain to percussion over the vertebral body at T8/T9. No pain to percussion over either rib cage. Upper Extremities: ROM is physiologic. Lower Extremities: Full ROM  bilaterally. Skin: Dry and warm with no rash. Neuro: Alert and oriented x3. Mood is normal. Affect is normal. Speech is articulate and fluent. Reflexes: DTR's are  symmetric, intact and 2+ bilaterally. Tremor with outstretched hands on the left. None on the right. Psych: Patient's attitude is cooperative. Patient's affect is appropriate. Judgement is realistic. Insight is appropriate. Diagnoses and all orders for this visit:    COPD exacerbation ([address]    Hypoxia    AVM (arteriovenous malformation)    Chronic coronary artery disease    Benign essential tremor    Prostate cancer (HCC)    Malignant neoplasm of overlapping sites of bladder ([address]    Tobacco user    Lymphedema    Iron deficiency anemia due to chronic blood loss    Patient will need admitted to the hospital.  At this point time I really have not improved his situation despite the above measures. I have spoken to the hospitalist  who agrees to the admission.

## 2021-05-18 ENCOUNTER — TELEPHONE (OUTPATIENT)
Dept: PRIMARY CARE CLINIC | Age: 76
End: 2021-05-18

## 2021-05-18 NOTE — TELEPHONE ENCOUNTER
Tye Verduzco left message asking for Dr Jesus Manuel Fields to call her about Claudia Grayson ASA. I spoke with her this morning and she said when Claudia Grayson was in the hospital he was told he likely had 6 weeks - 3 months to live. Tye Verduzco was wanting to know if that was true and if so, should they be calling Hospice in? She said he is feeling pretty miserable- his legs hurt, his stomach hurts where he is getting the shots, and he has some difficulty breathing.

## 2021-05-19 NOTE — TELEPHONE ENCOUNTER
Please call daughter. I have reviewed his records from the hospital.  The patient does have severe COPD and hypoxic respiratory failure but this has been chronic in nature. It is worsening. His blood counts have been relatively stable as followed by Dr. Ti Hensley. I cannot predict that he has less than 6 months to live as he has had significant respiratory problems which have been longstanding. If she would like palliative care or hospice to be involved I do not have any problems with trying to get them.

## 2021-05-26 ENCOUNTER — TELEPHONE (OUTPATIENT)
Dept: FAMILY MEDICINE CLINIC | Age: 76
End: 2021-05-26

## 2021-05-26 DIAGNOSIS — Q27.30 AVM (ARTERIOVENOUS MALFORMATION): Primary | ICD-10-CM

## 2021-05-26 DIAGNOSIS — J96.11 CHRONIC RESPIRATORY FAILURE WITH HYPOXIA (HCC): ICD-10-CM

## 2021-05-26 NOTE — TELEPHONE ENCOUNTER
Jaiden Boo - Daughter       She is calling to tell you that they have decided to involve Hospice at this time. She is leaving it up to you which group you choose for him.

## 2021-06-02 ENCOUNTER — TELEPHONE (OUTPATIENT)
Dept: FAMILY MEDICINE CLINIC | Age: 76
End: 2021-06-02

## 2021-06-02 NOTE — TELEPHONE ENCOUNTER
I spoke to the daughter. The patient refuses to give a blood transfusion so therefore cannot be admitted to hospice. They question whether or not the patient could be admitted to a nursing home. I have advised them to take him to the emergency room as he would need to be admitted to the hospital for 3 days in order to qualify for admission to the nursing home.

## 2021-06-02 NOTE — TELEPHONE ENCOUNTER
The patients daughter is calling saying that Jessica Denis has gone down hill drastically. He is having issues getting out of his chair and just walking. She states that she is leaving work because he hasn't even left his chair to eat today. She is requesting a call from you to discuss the next steps in care.

## (undated) DEVICE — CANNULA NSL ORAL AD FOR CAPNOFLEX CO2 O2 AIRLFE